# Patient Record
Sex: MALE | Race: WHITE | NOT HISPANIC OR LATINO | Employment: FULL TIME | URBAN - METROPOLITAN AREA
[De-identification: names, ages, dates, MRNs, and addresses within clinical notes are randomized per-mention and may not be internally consistent; named-entity substitution may affect disease eponyms.]

---

## 2017-11-19 ENCOUNTER — IMMUNIZATION (OUTPATIENT)
Dept: URGENT CARE | Facility: CLINIC | Age: 57
End: 2017-11-19

## 2017-11-19 PROCEDURE — 90471 IMMUNIZATION ADMIN: CPT | Mod: S$GLB,,, | Performed by: FAMILY MEDICINE

## 2017-11-19 PROCEDURE — 90686 IIV4 VACC NO PRSV 0.5 ML IM: CPT | Mod: S$GLB,,, | Performed by: FAMILY MEDICINE

## 2023-02-15 ENCOUNTER — OFFICE VISIT (OUTPATIENT)
Dept: CARDIOLOGY | Facility: CLINIC | Age: 63
End: 2023-02-15
Payer: MEDICARE

## 2023-02-15 VITALS
WEIGHT: 178.56 LBS | HEART RATE: 64 BPM | SYSTOLIC BLOOD PRESSURE: 127 MMHG | OXYGEN SATURATION: 98 % | DIASTOLIC BLOOD PRESSURE: 77 MMHG

## 2023-02-15 DIAGNOSIS — E78.5 HYPERLIPIDEMIA LDL GOAL <70: ICD-10-CM

## 2023-02-15 DIAGNOSIS — E11.69 TYPE 2 DIABETES MELLITUS WITH OTHER SPECIFIED COMPLICATION, UNSPECIFIED WHETHER LONG TERM INSULIN USE: ICD-10-CM

## 2023-02-15 DIAGNOSIS — R93.1 ELEVATED CORONARY ARTERY CALCIUM SCORE: ICD-10-CM

## 2023-02-15 DIAGNOSIS — I25.118 CORONARY ARTERY DISEASE OF NATIVE ARTERY OF NATIVE HEART WITH STABLE ANGINA PECTORIS: ICD-10-CM

## 2023-02-15 DIAGNOSIS — I65.23 CAROTID STENOSIS, ASYMPTOMATIC, BILATERAL: ICD-10-CM

## 2023-02-15 DIAGNOSIS — I10 HYPERTENSION, UNSPECIFIED TYPE: ICD-10-CM

## 2023-02-15 DIAGNOSIS — I20.89 STABLE ANGINA: Primary | ICD-10-CM

## 2023-02-15 PROCEDURE — 99999 PR PBB SHADOW E&M-NEW PATIENT-LVL III: CPT | Mod: PBBFAC,,, | Performed by: NURSE PRACTITIONER

## 2023-02-15 PROCEDURE — 99203 OFFICE O/P NEW LOW 30 MIN: CPT | Mod: PBBFAC | Performed by: NURSE PRACTITIONER

## 2023-02-15 PROCEDURE — 99204 OFFICE O/P NEW MOD 45 MIN: CPT | Mod: S$PBB,,, | Performed by: NURSE PRACTITIONER

## 2023-02-15 PROCEDURE — 99999 PR PBB SHADOW E&M-NEW PATIENT-LVL III: ICD-10-PCS | Mod: PBBFAC,,, | Performed by: NURSE PRACTITIONER

## 2023-02-15 PROCEDURE — 99204 PR OFFICE/OUTPT VISIT, NEW, LEVL IV, 45-59 MIN: ICD-10-PCS | Mod: S$PBB,,, | Performed by: NURSE PRACTITIONER

## 2023-02-15 RX ORDER — SIMVASTATIN 40 MG/1
40 TABLET, FILM COATED ORAL NIGHTLY
COMMUNITY
Start: 2023-01-30 | End: 2023-02-15 | Stop reason: ALTCHOICE

## 2023-02-15 RX ORDER — METFORMIN HYDROCHLORIDE 500 MG/1
500 TABLET, EXTENDED RELEASE ORAL 4 TIMES DAILY
COMMUNITY
End: 2024-03-08 | Stop reason: SDUPTHER

## 2023-02-15 RX ORDER — NITROGLYCERIN 0.4 MG/1
0.4 TABLET SUBLINGUAL EVERY 5 MIN PRN
Qty: 30 TABLET | Refills: 3 | Status: SHIPPED | OUTPATIENT
Start: 2023-02-15 | End: 2024-02-15

## 2023-02-15 RX ORDER — ATORVASTATIN CALCIUM 80 MG/1
80 TABLET, FILM COATED ORAL DAILY
Qty: 90 TABLET | Refills: 3 | Status: SHIPPED | OUTPATIENT
Start: 2023-02-15 | End: 2024-01-26

## 2023-02-15 RX ORDER — ATENOLOL 50 MG/1
50 TABLET ORAL 2 TIMES DAILY
COMMUNITY
Start: 2023-01-30

## 2023-02-15 RX ORDER — LINAGLIPTIN 5 MG/1
5 TABLET, FILM COATED ORAL
COMMUNITY
Start: 2023-01-30 | End: 2024-03-08

## 2023-02-15 NOTE — PROGRESS NOTES
[facility]    CC: Establish care    Patient ID: Cb Swan is a 63 y.o. 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 He was also started on ASA 81mg.    Carotid u/s revealed:  1-39% stenosis in the proximal right internal carotid artery based on Bluth Criteria.     1-39% stenosis in the proximal left internal carotid artery based on Bluth Criteria.     Antegrade right vertebral artery flow.     Antegrade left vertebral artery flow.       EKG- 2023 NSR    He did not return to [facility] for stress test or echo.    BP home log reviewed; BP controlled   Takes Lotrel and atenolol; reports compliance    Recent lipid panel reviewed from [facility]:  Chol 211  Tri 418  HDL 28      Has DM type 2; Most recent A1C 11.7; is not on insulin   Positive family history CAD; mother  of heart attack    Denies palpitations, SOB/HEBERT, PND, orthopnea, pre-syncope, syncope, BLE swelling.       Past Medical History:   Diagnosis Date    Diabetes mellitus     Hypertension      No past surgical history on file.  Social History     Socioeconomic History    Marital status:    Tobacco Use    Smoking status: Former   Substance and Sexual Activity    Alcohol use: No     Family History   Problem Relation Age of Onset    Diabetes Mother     Diabetes Sister        Review of patient's  allergies indicates:  No Known Allergies    Medication List with Changes/Refills   New Medications    NITROGLYCERIN (NITROSTAT) 0.4 MG SL TABLET    Place 1 tablet (0.4 mg total) under the tongue every 5 (five) minutes as needed for Chest pain.   Current Medications    ATENOLOL (TENORMIN) 50 MG TABLET    Take 50 mg by mouth 2 (two) times daily.    EMPAGLIFLOZIN (JARDIANCE) 10 MG TABLET    Jardiance 10 mg tablet    METFORMIN (GLUCOPHAGE-XR) 500 MG ER 24HR TABLET    metformin  mg tablet,extended release 24 hr    TRADJENTA 5 MG TAB TABLET    Take 5 mg by mouth.   Discontinued Medications    SIMVASTATIN (ZOCOR) 40 MG TABLET    Take 40 mg by mouth every evening.           Review of Systems   Constitutional: Negative.   Cardiovascular:  Positive for chest pain. Negative for claudication, cyanosis, dyspnea on exertion, irregular heartbeat, leg swelling, near-syncope, orthopnea, palpitations, paroxysmal nocturnal dyspnea and syncope.   Respiratory: Negative.     Skin: Negative.    Musculoskeletal: Negative.    Gastrointestinal:  Positive for abdominal pain.        Excess belching/burping with chest pain episode   Psychiatric/Behavioral: Negative.       Vitals:    02/15/23 0843   BP: 127/77   Pulse: 64   SpO2: 98%   Weight: 81 kg (178 lb 9.2 oz)          Physical Exam  Constitutional:       Appearance: He is obese.   HENT:      Head: Normocephalic.   Cardiovascular:      Rate and Rhythm: Normal rate and regular rhythm.      Heart sounds: Normal heart sounds.   Pulmonary:      Effort: Pulmonary effort is normal.      Breath sounds: Normal breath sounds.   Genitourinary:     Penis: Normal.    Musculoskeletal:         General: Normal range of motion.   Skin:     General: Skin is warm and dry.   Neurological:      Mental Status: He is alert and oriented to person, place, and time.         Labs:  Most Recent Data  CBC: No results found for: WBC, HGB, HCT, PLT, MCV, RDW  BMP: No results found for: NA, K, CL, CO2, BUN,  CREATININE, GLU, CALCIUM, MG, PHOS  LFTS; No results found for: PROT, ALBUMIN, BILITOT, AST, ALKPHOS, ALT, GGT  COAGS: No results found for: INR, PROTIME, PTT  FLP: No results found for: CHOL, HDL, LDLCALC, TRIG, CHOLHDL  CARDIAC: No results found for: TROPONINI, CKMB, BNP    Assessment/Plan:  Problem List Items Addressed This Visit          Cardiac/Vascular    Coronary artery disease of native artery of native heart with stable angina pectoris    Current Assessment & Plan     Elevated calcium score >600 in 2011  Takes ASA + statin therapy; continue   Concerns for stable angina  Recent EKG NSR, no ST changes  Start PRN nitro  Stress echo and follow up; if stress testing is abnormal, will proceed with LH scheduled at [place] with Dr. Sandoval           Hypertension    Current Assessment & Plan     Controlled  Continue current regimen         Elevated coronary artery calcium score    Current Assessment & Plan     Evans score >600 in 2011           Hyperlipidemia LDL goal <70    Current Assessment & Plan     Given CAD, uncontrolled DM- LDL goal <70  Recent panel reviewed; chol 211, trig 418, HDL 28,   Stop simvastatin   Start atorvastatin   Triglycerides and elevated A1C directly correlated; Trig should lower as glucose improves  Repeat lipid panel in nearest future             Carotid stenosis, asymptomatic, bilateral    Current Assessment & Plan     Asymptomatic  Less than 39% bilaterally to internal carotid arteries   Continue ASA , statin             Endocrine    Type 2 diabetes mellitus with other specified complication    Current Assessment & Plan     Aggressive risk factor management  A1C 11.7  He is not on insulin at this time   Management deferred to PCP          Other Visit Diagnoses       Stable angina    -  Primary            Update 2/23/2023:  Exercise stress test positive (below). Recommend proceeding with coronary angiogram to treat coronary lesions resulting in stable angina. Risk vs benefits  discussed with patient and he wishes to proceed. He will check his schedule and notify the clinic with a date and time that works for him. In the interm, continue with PRN nitro tabs, limit strenuous activity.     The ECG portion of this study is positive for myocardial ischemia.  Exercise protocol terminated early secondary to angina. NTG relieved anginal pains briskly in recovery phase. Max HR 94 BPM obtained.  The left ventricle is normal in size with normal systolic function.  The estimated ejection fraction is 55%.  The stress echo portion of this study did not demonstrate wall motion abnormalities.  There were no arrhythmias during stress.    Total duration of face to face visit time 30 minutes.  Total time spent counseling greater than fifty percent of total visit time.  Counseling included discussion regarding imaging findings, diagnosis, possibilities, treatment options, risks and benefits.  The patient had many questions regarding the options and long-term effects.    eGmma Perez, OLIVIA-C  Cardiology Clinic  [facility]

## 2023-02-15 NOTE — ASSESSMENT & PLAN NOTE
Given CAD, uncontrolled DM- LDL goal <70  Recent panel reviewed; chol 211, trig 418, HDL 28,   Stop simvastatin   Start atorvastatin   Triglycerides likely elevated due to elevated glucose;  Treat underlying disease. Will consider Lopid with repeat panel   Repeat lipid panel in nearest future

## 2023-02-15 NOTE — ASSESSMENT & PLAN NOTE
Aggressive risk factor management  A1C 11.7  He is not on insulin at this time   Management deferred to PCP

## 2023-02-15 NOTE — ASSESSMENT & PLAN NOTE
Elevated calcium score >600 in 2011  Takes ASA + statin therapy; continue   Concerns for stable angina  Recent EKG NSR, no ST changes  Start PRN nitro  Stress echo and follow up; if stress testing is abnormal, will proceed with LHC scheduled at [place] with Dr. Sandoval

## 2023-02-16 ENCOUNTER — HOSPITAL ENCOUNTER (OUTPATIENT)
Dept: PULMONOLOGY | Facility: HOSPITAL | Age: 63
Discharge: HOME | End: 2023-02-16
Attending: NURSE PRACTITIONER
Payer: MEDICARE

## 2023-02-16 DIAGNOSIS — I20.89 STABLE ANGINA: ICD-10-CM

## 2023-02-16 DIAGNOSIS — I25.118 CORONARY ARTERY DISEASE OF NATIVE ARTERY OF NATIVE HEART WITH STABLE ANGINA PECTORIS: Primary | ICD-10-CM

## 2023-02-16 LAB
CV STRESS BASE HR: 73 BPM
DIASTOLIC BLOOD PRESSURE: 71 MMHG
EJECTION FRACTION: 55 %
OHS CV CPX 1 MINUTE RECOVERY HEART RATE: 99 BPM
OHS CV CPX 85 PERCENT MAX PREDICTED HEART RATE MALE: 132
OHS CV CPX ESTIMATED METS: 7
OHS CV CPX MAX PREDICTED HEART RATE: 155
OHS CV CPX PATIENT IS FEMALE: 0
OHS CV CPX PATIENT IS MALE: 1
OHS CV CPX PEAK DIASTOLIC BLOOD PRESSURE: 68 MMHG
OHS CV CPX PEAK HEAR RATE: 120 BPM
OHS CV CPX PEAK RATE PRESSURE PRODUCT: NORMAL
OHS CV CPX PEAK SYSTOLIC BLOOD PRESSURE: 162 MMHG
OHS CV CPX PERCENT MAX PREDICTED HEART RATE ACHIEVED: 77
OHS CV CPX RATE PRESSURE PRODUCT PRESENTING: NORMAL
STRESS ECHO POST EXERCISE DUR MIN: 5 MINUTES
STRESS ECHO POST EXERCISE DUR SEC: 49 SECONDS
STRESS ST DEPRESSION: 1 MM
SYSTOLIC BLOOD PRESSURE: 142 MMHG

## 2023-02-16 PROCEDURE — 93351 STRESS TTE COMPLETE: CPT

## 2023-02-16 PROCEDURE — 93351 STRESS ECHO (CUPID ONLY): ICD-10-PCS | Mod: 26,,, | Performed by: INTERNAL MEDICINE

## 2023-02-16 PROCEDURE — 93351 STRESS TTE COMPLETE: CPT | Mod: 26,,, | Performed by: INTERNAL MEDICINE

## 2023-02-16 RX ORDER — ASPIRIN 81 MG/1
81 TABLET ORAL DAILY
Refills: 0
Start: 2023-02-16 | End: 2024-02-16

## 2023-02-16 RX ORDER — AMLODIPINE BESYLATE 2.5 MG/1
2.5 TABLET ORAL DAILY
Qty: 90 TABLET | Refills: 3 | Status: SHIPPED | OUTPATIENT
Start: 2023-02-16 | End: 2023-02-16

## 2023-02-16 RX ORDER — AMLODIPINE AND BENAZEPRIL HYDROCHLORIDE 10; 40 MG/1; MG/1
1 CAPSULE ORAL DAILY
Qty: 90 CAPSULE | Refills: 3
Start: 2023-02-16 | End: 2024-02-16

## 2023-02-17 ENCOUNTER — TELEPHONE (OUTPATIENT)
Dept: CARDIOLOGY | Facility: CLINIC | Age: 63
End: 2023-02-17
Payer: MEDICARE

## 2023-02-17 NOTE — TELEPHONE ENCOUNTER
----- Message from Gemma Perez NP sent at 2/16/2023  3:08 PM CST -----  Please ensure patient has follow up appointment scheduled to discuss stress test results and recommendations for LHC.

## 2023-02-17 NOTE — TELEPHONE ENCOUNTER
----- Message from Stevan Sandoval MD sent at 2/16/2023  8:56 AM CST -----  Please book a his wife had a lot of questions about the heart catheterization that we are going to do upcoming.  He had a treadmill test today

## 2023-02-22 ENCOUNTER — PATIENT MESSAGE (OUTPATIENT)
Dept: CARDIOLOGY | Facility: CLINIC | Age: 63
End: 2023-02-22
Payer: MEDICARE

## 2023-02-23 DIAGNOSIS — I25.118 CORONARY ARTERY DISEASE OF NATIVE ARTERY OF NATIVE HEART WITH STABLE ANGINA PECTORIS: Primary | ICD-10-CM

## 2023-02-27 DIAGNOSIS — R94.39 ABNORMAL STRESS TEST: ICD-10-CM

## 2023-02-27 DIAGNOSIS — I20.89 ANGINA OF EFFORT: Primary | ICD-10-CM

## 2023-02-27 RX ORDER — DIPHENHYDRAMINE HCL 25 MG
50 CAPSULE ORAL ONCE
Status: CANCELLED | OUTPATIENT
Start: 2023-02-27 | End: 2023-02-27

## 2023-02-27 RX ORDER — SODIUM CHLORIDE 0.9 % (FLUSH) 0.9 %
10 SYRINGE (ML) INJECTION
Status: DISCONTINUED | OUTPATIENT
Start: 2023-02-27 | End: 2023-03-14 | Stop reason: HOSPADM

## 2023-03-09 ENCOUNTER — PATIENT MESSAGE (OUTPATIENT)
Dept: CARDIOLOGY | Facility: CLINIC | Age: 63
End: 2023-03-09
Payer: MEDICARE

## 2023-03-09 ENCOUNTER — LAB VISIT (OUTPATIENT)
Dept: LAB | Facility: HOSPITAL | Age: 63
End: 2023-03-09
Attending: NURSE PRACTITIONER
Payer: MEDICARE

## 2023-03-09 DIAGNOSIS — I25.118 CORONARY ARTERY DISEASE OF NATIVE ARTERY OF NATIVE HEART WITH STABLE ANGINA PECTORIS: ICD-10-CM

## 2023-03-09 LAB
ANION GAP SERPL CALC-SCNC: 14 MMOL/L (ref 8–16)
BASOPHILS # BLD AUTO: 0.03 K/UL (ref 0–0.2)
BASOPHILS NFR BLD: 0.4 % (ref 0–1.9)
BUN SERPL-MCNC: 15 MG/DL (ref 8–23)
CALCIUM SERPL-MCNC: 9.3 MG/DL (ref 8.7–10.5)
CHLORIDE SERPL-SCNC: 106 MMOL/L (ref 95–110)
CO2 SERPL-SCNC: 21 MMOL/L (ref 23–29)
CREAT SERPL-MCNC: 1.3 MG/DL (ref 0.5–1.4)
DIFFERENTIAL METHOD: ABNORMAL
EOSINOPHIL # BLD AUTO: 0.1 K/UL (ref 0–0.5)
EOSINOPHIL NFR BLD: 1.3 % (ref 0–8)
ERYTHROCYTE [DISTWIDTH] IN BLOOD BY AUTOMATED COUNT: 12.4 % (ref 11.5–14.5)
EST. GFR  (NO RACE VARIABLE): >60 ML/MIN/1.73 M^2
GLUCOSE SERPL-MCNC: 170 MG/DL (ref 70–110)
HCT VFR BLD AUTO: 44 % (ref 40–54)
HGB BLD-MCNC: 15 G/DL (ref 14–18)
IMM GRANULOCYTES # BLD AUTO: 0.02 K/UL (ref 0–0.04)
IMM GRANULOCYTES NFR BLD AUTO: 0.3 % (ref 0–0.5)
LYMPHOCYTES # BLD AUTO: 2 K/UL (ref 1–4.8)
LYMPHOCYTES NFR BLD: 24.9 % (ref 18–48)
MCH RBC QN AUTO: 31.4 PG (ref 27–31)
MCHC RBC AUTO-ENTMCNC: 34.1 G/DL (ref 32–36)
MCV RBC AUTO: 92 FL (ref 82–98)
MONOCYTES # BLD AUTO: 0.6 K/UL (ref 0.3–1)
MONOCYTES NFR BLD: 7.4 % (ref 4–15)
NEUTROPHILS # BLD AUTO: 5.2 K/UL (ref 1.8–7.7)
NEUTROPHILS NFR BLD: 65.7 % (ref 38–73)
NRBC BLD-RTO: 0 /100 WBC
PLATELET # BLD AUTO: 183 K/UL (ref 150–450)
PMV BLD AUTO: 9.9 FL (ref 9.2–12.9)
POTASSIUM SERPL-SCNC: 3.8 MMOL/L (ref 3.5–5.1)
RBC # BLD AUTO: 4.78 M/UL (ref 4.6–6.2)
SODIUM SERPL-SCNC: 141 MMOL/L (ref 136–145)
WBC # BLD AUTO: 7.86 K/UL (ref 3.9–12.7)

## 2023-03-09 PROCEDURE — 85025 COMPLETE CBC W/AUTO DIFF WBC: CPT | Performed by: NURSE PRACTITIONER

## 2023-03-09 PROCEDURE — 36415 COLL VENOUS BLD VENIPUNCTURE: CPT | Performed by: NURSE PRACTITIONER

## 2023-03-09 PROCEDURE — 80048 BASIC METABOLIC PNL TOTAL CA: CPT | Performed by: NURSE PRACTITIONER

## 2023-03-10 ENCOUNTER — NURSE TRIAGE (OUTPATIENT)
Dept: ADMINISTRATIVE | Facility: CLINIC | Age: 63
End: 2023-03-10
Payer: MEDICARE

## 2023-03-10 NOTE — TELEPHONE ENCOUNTER
OOC RN  Patient Returning someone call in reference to preop for heart cath with Dr. Jaime Calles scheduled for 3/14/23   patient did rec  my chart message Patient needs to be pre op.   Can someone reach out to him?       Reason for Disposition   Nursing judgment    Protocols used: Information Only Call - No Triage-A-OH

## 2023-03-14 ENCOUNTER — TELEPHONE (OUTPATIENT)
Dept: CARDIOLOGY | Facility: CLINIC | Age: 63
End: 2023-03-14

## 2023-03-14 NOTE — TELEPHONE ENCOUNTER
----- Message from Stevan Sandoval MD sent at 3/14/2023 11:40 AM CDT -----  He needs a follow-up visit in [place] in 1 week.

## 2023-03-23 ENCOUNTER — OFFICE VISIT (OUTPATIENT)
Dept: CARDIOLOGY | Facility: CLINIC | Age: 63
End: 2023-03-23
Payer: MEDICARE

## 2023-03-23 VITALS
RESPIRATION RATE: 18 BRPM | HEART RATE: 72 BPM | BODY MASS INDEX: 30.28 KG/M2 | WEIGHT: 170.88 LBS | DIASTOLIC BLOOD PRESSURE: 70 MMHG | HEIGHT: 63 IN | SYSTOLIC BLOOD PRESSURE: 122 MMHG | OXYGEN SATURATION: 97 %

## 2023-03-23 DIAGNOSIS — I10 PRIMARY HYPERTENSION: ICD-10-CM

## 2023-03-23 DIAGNOSIS — I25.118 CORONARY ARTERY DISEASE OF NATIVE ARTERY OF NATIVE HEART WITH STABLE ANGINA PECTORIS: ICD-10-CM

## 2023-03-23 DIAGNOSIS — E78.2 MIXED HYPERLIPIDEMIA: ICD-10-CM

## 2023-03-23 DIAGNOSIS — E11.69 TYPE 2 DIABETES MELLITUS WITH OTHER SPECIFIED COMPLICATION, WITHOUT LONG-TERM CURRENT USE OF INSULIN: ICD-10-CM

## 2023-03-23 PROBLEM — E78.5 HYPERLIPIDEMIA LDL GOAL <70: Status: RESOLVED | Noted: 2023-02-15 | Resolved: 2023-03-23

## 2023-03-23 PROCEDURE — 99999 PR PBB SHADOW E&M-EST. PATIENT-LVL III: CPT | Mod: PBBFAC,,, | Performed by: INTERNAL MEDICINE

## 2023-03-23 PROCEDURE — 99999 PR STA SHADOW: CPT | Mod: PBBFAC,,, | Performed by: INTERNAL MEDICINE

## 2023-03-23 PROCEDURE — 99214 OFFICE O/P EST MOD 30 MIN: CPT | Mod: S$PBB | Performed by: INTERNAL MEDICINE

## 2023-03-23 PROCEDURE — 99213 OFFICE O/P EST LOW 20 MIN: CPT | Mod: PBBFAC | Performed by: INTERNAL MEDICINE

## 2023-03-23 PROCEDURE — 99999 PR PBB SHADOW E&M-EST. PATIENT-LVL III: ICD-10-PCS | Mod: PBBFAC,,, | Performed by: INTERNAL MEDICINE

## 2023-03-23 NOTE — ASSESSMENT & PLAN NOTE
He thinks his A1c will be better.  He just came off a period of time when he was noncompliant with diabetic management and had gained weight.  Condition improving.    Jardiance, Tradjenta to be continued, he has side effects to metformin.  He will check in with his physician.

## 2023-03-23 NOTE — ASSESSMENT & PLAN NOTE
Recent stent-drug-eluting type to circumflex.  He is free of angina.  He is on aspirin and Plavix.    We reviewed his films including discussion of diseased right coronary artery.  Continued medical therapy advised for now.

## 2023-03-23 NOTE — PROGRESS NOTES
[facility]     Subjective:    Patient ID:  Cb Swan is a 63 y.o. male who presents for follow-up of Coronary Artery Disease, Hypertension, Hyperlipidemia, and Chest Pain    Review of patient's allergies indicates:  No Known Allergies   He underwent coronary angiogram with stent placement to circumflex for high-grade stenosis associated with worsening angina.  He has done well since hospital discharge.  He has some cutaneous bruising.  He is tolerating aspirin and Plavix.    His blood pressure is normal today.  He is on Lotrel and atenolol.  He is diabetic.  He states that he had most recent A1c 11 range.  He had stopped his medications and gained weight.  He has now lost weight and it is 2 months later.  He thinks his sugars will be much better.  He is on Tradjenta, Jardiance, metformin.  States that metformin does cause gastrointestinal side effects.    He has not done a lot of exercise.  He wants to get back into it      Review of Systems   Constitutional: Positive for weight loss. Negative for chills, decreased appetite, diaphoresis, fever, malaise/fatigue and night sweats.   HENT:  Negative for congestion, ear discharge, ear pain, hearing loss, hoarse voice, nosebleeds, odynophagia, sore throat, stridor and tinnitus.    Eyes:  Negative for blurred vision, discharge, double vision, pain, photophobia, redness, vision loss in left eye, vision loss in right eye, visual disturbance and visual halos.   Cardiovascular:  Negative for chest pain, claudication, cyanosis, dyspnea on exertion, irregular heartbeat, leg swelling, near-syncope, orthopnea, palpitations, paroxysmal nocturnal dyspnea and syncope.   Respiratory:  Negative for cough, hemoptysis, shortness of breath, sleep disturbances due to breathing, snoring, sputum production and wheezing.    Endocrine: Negative for cold intolerance, heat intolerance, polydipsia, polyphagia and  "polyuria.   Hematologic/Lymphatic: Negative for adenopathy and bleeding problem. Bruises/bleeds easily.   Skin:  Negative for color change, dry skin, flushing, itching, nail changes, poor wound healing, rash, skin cancer, suspicious lesions and unusual hair distribution.   Musculoskeletal:  Negative for arthritis, back pain, falls, gout, joint pain, joint swelling, muscle cramps, muscle weakness, myalgias, neck pain and stiffness.   Gastrointestinal:  Negative for bloating, abdominal pain, anorexia, change in bowel habit, bowel incontinence, constipation, diarrhea, dysphagia, excessive appetite, flatus, heartburn, hematemesis, hematochezia, hemorrhoids, jaundice, melena, nausea and vomiting.   Genitourinary:  Negative for bladder incontinence, decreased libido, dysuria, flank pain, frequency, genital sores, hematuria, hesitancy, incomplete emptying, nocturia and urgency.   Neurological:  Negative for aphonia, brief paralysis, difficulty with concentration, disturbances in coordination, excessive daytime sleepiness, dizziness, focal weakness, headaches, light-headedness, loss of balance, numbness, paresthesias, seizures, sensory change, tremors, vertigo and weakness.   Psychiatric/Behavioral:  Negative for altered mental status, depression, hallucinations, memory loss, substance abuse, suicidal ideas and thoughts of violence. The patient does not have insomnia and is not nervous/anxious.    Allergic/Immunologic: Negative for hives and persistent infections.      Objective:       Vitals:    03/23/23 0833   BP: 122/70   BP Location: Right arm   Patient Position: Sitting   BP Method: Medium (Manual)   Pulse: 72   Resp: 18   SpO2: 97%   Weight: 77.5 kg (170 lb 13.7 oz)   Height: 5' 3" (1.6 m)    Physical Exam  Constitutional:       General: He is not in acute distress.     Appearance: He is well-developed. He is not diaphoretic.   HENT:      Head: Normocephalic and atraumatic.      Nose: Nose normal.   Eyes:      " General: No scleral icterus.        Right eye: No discharge.      Conjunctiva/sclera: Conjunctivae normal.      Pupils: Pupils are equal, round, and reactive to light.   Neck:      Thyroid: No thyromegaly.      Vascular: No JVD.      Trachea: No tracheal deviation.   Cardiovascular:      Rate and Rhythm: Normal rate and regular rhythm.      Pulses:           Carotid pulses are 2+ on the right side and 2+ on the left side.       Radial pulses are 2+ on the right side and 2+ on the left side.        Dorsalis pedis pulses are 2+ on the right side and 2+ on the left side.        Posterior tibial pulses are 2+ on the right side and 2+ on the left side.      Heart sounds: Normal heart sounds. No murmur heard.    No friction rub. No gallop.   Pulmonary:      Effort: Pulmonary effort is normal. No respiratory distress.      Breath sounds: Normal breath sounds. No stridor. No wheezing or rales.   Chest:      Chest wall: No tenderness.   Abdominal:      General: Bowel sounds are normal. There is no distension.      Palpations: Abdomen is soft. There is no mass.      Tenderness: There is no abdominal tenderness. There is no guarding or rebound.   Musculoskeletal:         General: No tenderness. Normal range of motion.      Cervical back: Normal range of motion and neck supple.   Lymphadenopathy:      Cervical: No cervical adenopathy.   Skin:     General: Skin is warm and dry.      Coloration: Skin is not pale.      Findings: No erythema or rash.   Neurological:      Mental Status: He is alert and oriented to person, place, and time.      Cranial Nerves: No cranial nerve deficit.      Coordination: Coordination normal.   Psychiatric:         Behavior: Behavior normal.         Thought Content: Thought content normal.         Judgment: Judgment normal.         Assessment:       1. Primary hypertension    2. Mixed hyperlipidemia    3. Coronary artery disease of native artery of native heart with stable angina pectoris    4. Type 2  diabetes mellitus with other specified complication, without long-term current use of insulin      Results for orders placed or performed during the hospital encounter of 03/14/23   Lipid panel   Result Value Ref Range    Cholesterol 108 (L) 120 - 199 mg/dL    Triglycerides 175 (H) 30 - 150 mg/dL    HDL 26 (L) 40 - 75 mg/dL    LDL Cholesterol 47.0 (L) 63.0 - 159.0 mg/dL    HDL/Cholesterol Ratio 24.1 20.0 - 50.0 %    Total Cholesterol/HDL Ratio 4.2 2.0 - 5.0    Non-HDL Cholesterol 82 mg/dL   Basic metabolic panel   Result Value Ref Range    Sodium 142 136 - 145 mmol/L    Potassium 4.6 3.5 - 5.1 mmol/L    Chloride 106 95 - 110 mmol/L    CO2 24 23 - 29 mmol/L    Glucose 120 (H) 70 - 110 mg/dL    BUN 19 2 - 20 mg/dL    Creatinine 1.03 0.50 - 1.40 mg/dL    Calcium 9.6 8.7 - 10.5 mg/dL    Anion Gap 12 8 - 16 mmol/L    eGFR >60.0 >60 mL/min/1.73 m^2   Type & Screen   Result Value Ref Range    Group & Rh B POS     Indirect Sandepe NEG    Cardiac catheterization   Result Value Ref Range    Cath EF Estimated 60 %   ISTAT ACT-K   Result Value Ref Range    POC ACTIVATED CLOTTING TIME K 305 (H) 74 - 137 sec    Sample ARTERIAL          Current Outpatient Medications:     amLODIPine-benazepriL (LOTREL) 10-40 mg per capsule, Take 1 capsule by mouth once daily., Disp: 90 capsule, Rfl: 3    aspirin (ECOTRIN) 81 MG EC tablet, Take 1 tablet (81 mg total) by mouth once daily., Disp: , Rfl: 0    atenoloL (TENORMIN) 50 MG tablet, Take 50 mg by mouth 2 (two) times daily., Disp: , Rfl:     atorvastatin (LIPITOR) 80 MG tablet, Take 1 tablet (80 mg total) by mouth once daily., Disp: 90 tablet, Rfl: 3    clopidogreL (PLAVIX) 75 mg tablet, Take 1 tablet (75 mg total) by mouth once daily., Disp: 90 tablet, Rfl: 3    empagliflozin (JARDIANCE) 10 mg tablet, Jardiance 10 mg tablet, Disp: , Rfl:     metFORMIN (GLUCOPHAGE-XR) 500 MG ER 24hr tablet, 500 mg 4 (four) times daily., Disp: , Rfl:     TRADJENTA 5 mg Tab tablet, Take 5 mg by mouth., Disp: ,  Rfl:     nitroGLYCERIN (NITROSTAT) 0.4 MG SL tablet, Place 1 tablet (0.4 mg total) under the tongue every 5 (five) minutes as needed for Chest pain. (Patient not taking: Reported on 3/23/2023), Disp: 30 tablet, Rfl: 3     Lab Results   Component Value Date    WBC 7.86 03/09/2023    RBC 4.78 03/09/2023    HGB 15.0 03/09/2023    HCT 44.0 03/09/2023    MCV 92 03/09/2023    MCH 31.4 (H) 03/09/2023    MCHC 34.1 03/09/2023    RDW 12.4 03/09/2023     03/09/2023    MPV 9.9 03/09/2023    GRAN 5.2 03/09/2023    GRAN 65.7 03/09/2023    LYMPH 2.0 03/09/2023    LYMPH 24.9 03/09/2023    MONO 0.6 03/09/2023    MONO 7.4 03/09/2023    EOS 0.1 03/09/2023    BASO 0.03 03/09/2023    EOSINOPHIL 1.3 03/09/2023    BASOPHIL 0.4 03/09/2023        CMP  Lab Results   Component Value Date     03/14/2023    K 4.6 03/14/2023     03/14/2023    CO2 24 03/14/2023     (H) 03/14/2023    BUN 19 03/14/2023    CREATININE 1.03 03/14/2023    CALCIUM 9.6 03/14/2023    ANIONGAP 12 03/14/2023        No results found for: LABBLOO, LABURIN, RESPIRATORYC, GSRESP         Results for orders placed or performed in visit on 03/14/23   EKG 12-lead    Collection Time: 03/14/23 11:13 AM    Narrative    Test Reason : Z98.890,    Vent. Rate : 061 BPM     Atrial Rate : 061 BPM     P-R Int : 138 ms          QRS Dur : 078 ms      QT Int : 402 ms       P-R-T Axes : 070 035 051 degrees     QTc Int : 404 ms    Normal sinus rhythm  Cannot rule out Anterior infarct ,age undetermined  Abnormal ECG  When compared with ECG of 14-MAR-2023 07:31,  No significant change was found  Confirmed by Alan PALOMARES MD, Jabier ESPINOZA ([id number]) on 3/14/2023 2:48:09 PM    Referred By: System System           Confirmed By:Jabier Phillips III, MD                  Plan:       Problem List Items Addressed This Visit          Cardiac/Vascular    Coronary artery disease of native artery of native heart with stable angina pectoris     Recent stent-drug-eluting type to circumflex.  He  is free of angina.  He is on aspirin and Plavix.    We reviewed his films including discussion of diseased right coronary artery.  Continued medical therapy advised for now.         Hypertension     The patient remains on amlodipine, atenolol, benazepril.  Blood pressure readings stable.  Blood pressure today 122/70 mm Hg.  No changes advised.         Mixed hyperlipidemia     Most recent LDL 47, HDL 26, triglycerides 175 mg % on atorvastatin 80 mg daily.  I do not advise any change in management.  Atorvastatin tolerated well.            Endocrine    Type 2 diabetes mellitus with other specified complication     He thinks his A1c will be better.  He just came off a period of time when he was noncompliant with diabetic management and had gained weight.  Condition improving.    Jardiance, Tradjenta to be continued, he has side effects to metformin.  He will check in with his physician.                 His angina has stabilized.  He is cleared to perform activities without restrictions.  I will see him back in 4 months.  Education provided regarding stenting, management of coronary disease long-term.  He does have moderate right coronary artery stenosis and mild LAD disease.  His ejection fraction is normal.           Stevan Sandoval MD  03/23/2023   9:13 AM

## 2023-03-23 NOTE — ASSESSMENT & PLAN NOTE
Most recent LDL 47, HDL 26, triglycerides 175 mg % on atorvastatin 80 mg daily.  I do not advise any change in management.  Atorvastatin tolerated well.

## 2023-03-23 NOTE — ASSESSMENT & PLAN NOTE
The patient remains on amlodipine, atenolol, benazepril.  Blood pressure readings stable.  Blood pressure today 122/70 mm Hg.  No changes advised.

## 2023-04-06 ENCOUNTER — OFFICE VISIT (OUTPATIENT)
Dept: URGENT CARE | Facility: CLINIC | Age: 63
End: 2023-04-06
Payer: MEDICARE

## 2023-04-06 VITALS
RESPIRATION RATE: 16 BRPM | SYSTOLIC BLOOD PRESSURE: 141 MMHG | HEART RATE: 56 BPM | DIASTOLIC BLOOD PRESSURE: 82 MMHG | BODY MASS INDEX: 30.11 KG/M2 | OXYGEN SATURATION: 99 % | WEIGHT: 170 LBS | TEMPERATURE: 98 F

## 2023-04-06 DIAGNOSIS — L23.9 ALLERGIC CONTACT DERMATITIS, UNSPECIFIED TRIGGER: Primary | ICD-10-CM

## 2023-04-06 PROCEDURE — 99213 PR OFFICE/OUTPT VISIT, EST, LEVL III, 20-29 MIN: ICD-10-PCS | Mod: S$GLB,,, | Performed by: FAMILY MEDICINE

## 2023-04-06 PROCEDURE — 99213 OFFICE O/P EST LOW 20 MIN: CPT | Mod: S$GLB,,, | Performed by: FAMILY MEDICINE

## 2023-04-06 RX ORDER — METHYLPREDNISOLONE 4 MG/1
TABLET ORAL
Qty: 21 EACH | Refills: 0 | Status: SHIPPED | OUTPATIENT
Start: 2023-04-06 | End: 2023-04-27

## 2023-04-06 RX ORDER — MOMETASONE FUROATE 1 MG/G
CREAM TOPICAL
Qty: 45 G | Refills: 1 | Status: SHIPPED | OUTPATIENT
Start: 2023-04-06 | End: 2023-07-24

## 2023-04-06 NOTE — PROGRESS NOTES
"Subjective:      Patient ID: Cb Swan is a 63 y.o. male.    Vitals:  weight is 77.1 kg (170 lb). His oral temperature is 97.5 °F (36.4 °C). His blood pressure is 141/82 (abnormal) and his pulse is 56 (abnormal). His respiration is 16 and oxygen saturation is 99%.     Chief Complaint: Rash    This is a 63 y.o. male who presents today with a chief complaint of rash to the hands and body that "seems to move" x 3 days. Rash is blotchy red and itches.     Home tx: Zyrtec (last dose yesterday) - some relief    PMH: [facility] and was given a steroid shot and told to take Zyrtec - rash subsided but returned in different location    Rash  The affected locations include the scalp, face, left ear, back, groin, left eye, left hand, left wrist, left fingers, right hand and right wrist. The rash is characterized by swelling, redness and itchiness. He was exposed to nothing. Pertinent negatives include no congestion, cough, fever or sore throat. There is no history of allergies or asthma.     Constitution: Negative for fever.   HENT:  Negative for congestion and sore throat.    Respiratory:  Negative for cough.    Skin:  Positive for rash and erythema.    Objective:     Physical Exam   Constitutional: He does not appear ill. No distress. normal  Cardiovascular: Normal rate, regular rhythm, normal heart sounds and normal pulses.   Pulmonary/Chest: Effort normal and breath sounds normal.   Abdominal: Normal appearance.   Neurological: He is alert.   Skin: Skin is rash. erythema and lesion (scattered red papular lesions/hives on extremities, back buttocks)   Nursing note and vitals reviewed.    Assessment:     1. Allergic contact dermatitis, unspecified trigger        Plan:       Allergic contact dermatitis, unspecified trigger  -     methylPREDNISolone (MEDROL DOSEPACK) 4 mg tablet; use as directed  Dispense: 21 each; Refill: 0  -     mometasone 0.1% (ELOCON) 0.1 % cream; Apply to affected area " daily  Dispense: 45 g; Refill: 1    RTC prn worsening symptoms

## 2023-07-24 ENCOUNTER — OFFICE VISIT (OUTPATIENT)
Dept: CARDIOLOGY | Facility: CLINIC | Age: 63
End: 2023-07-24
Payer: MEDICARE

## 2023-07-24 VITALS
SYSTOLIC BLOOD PRESSURE: 126 MMHG | WEIGHT: 174.81 LBS | RESPIRATION RATE: 18 BRPM | BODY MASS INDEX: 30.97 KG/M2 | HEIGHT: 63 IN | OXYGEN SATURATION: 98 % | DIASTOLIC BLOOD PRESSURE: 80 MMHG | HEART RATE: 61 BPM

## 2023-07-24 DIAGNOSIS — E11.69 TYPE 2 DIABETES MELLITUS WITH OTHER SPECIFIED COMPLICATION, WITHOUT LONG-TERM CURRENT USE OF INSULIN: ICD-10-CM

## 2023-07-24 DIAGNOSIS — I25.118 CORONARY ARTERY DISEASE OF NATIVE ARTERY OF NATIVE HEART WITH STABLE ANGINA PECTORIS: ICD-10-CM

## 2023-07-24 DIAGNOSIS — E66.09 CLASS 1 OBESITY DUE TO EXCESS CALORIES WITHOUT SERIOUS COMORBIDITY WITH BODY MASS INDEX (BMI) OF 30.0 TO 30.9 IN ADULT: ICD-10-CM

## 2023-07-24 DIAGNOSIS — I10 PRIMARY HYPERTENSION: ICD-10-CM

## 2023-07-24 DIAGNOSIS — I65.23 CAROTID STENOSIS, ASYMPTOMATIC, BILATERAL: ICD-10-CM

## 2023-07-24 DIAGNOSIS — E78.2 MIXED HYPERLIPIDEMIA: ICD-10-CM

## 2023-07-24 PROBLEM — E66.811 CLASS 1 OBESITY DUE TO EXCESS CALORIES WITHOUT SERIOUS COMORBIDITY IN ADULT: Status: ACTIVE | Noted: 2023-07-24

## 2023-07-24 PROCEDURE — 99999 PR STA SHADOW: CPT | Mod: PBBFAC,,, | Performed by: INTERNAL MEDICINE

## 2023-07-24 PROCEDURE — 99213 OFFICE O/P EST LOW 20 MIN: CPT | Mod: PBBFAC | Performed by: INTERNAL MEDICINE

## 2023-07-24 PROCEDURE — 99214 OFFICE O/P EST MOD 30 MIN: CPT | Mod: S$PBB | Performed by: INTERNAL MEDICINE

## 2023-07-24 PROCEDURE — 99999 PR PBB SHADOW E&M-EST. PATIENT-LVL III: CPT | Mod: PBBFAC,,, | Performed by: INTERNAL MEDICINE

## 2023-07-24 PROCEDURE — 99999 PR STA SHADOW: ICD-10-PCS | Mod: PBBFAC,,, | Performed by: INTERNAL MEDICINE

## 2023-07-24 NOTE — ASSESSMENT & PLAN NOTE
Most recent profile confirms LDL 47.  Current therapy-atorvastatin 80 to continue.  It is well tolerated.  He has upcoming labs with his PCP.

## 2023-07-24 NOTE — PROGRESS NOTES
[facility] Cardiology     Subjective:    Patient ID:  Cb Swan is a 63 y.o. male who presents for follow-up of Hypertension, Hyperlipidemia, Diabetes Mellitus, and Coronary Artery Disease    Review of patient's allergies indicates:  No Known Allergies   He is followed for coronary artery disease with history of stenting March 14, 2023 to circumflex for high-grade stenosis.  It was a discrete midvessel lesion.  He had an abnormal stress test and reported exertional angina preceding the stent procedure.  He remains on calcium channel blocker therapy, beta-blocker therapy.  He monitors his blood pressures and sees mostly 120-130 mmHg systolic range readings.  He stays active.  He is on aspirin and Plavix.  He reports minimal bruising intermittently.    The patient's LDL most recently was 47 mg% in March of 2023 with HDL 26, triglycerides 175 mg%.  He is on high-dose atorvastatin 80 mg.  He has upcoming yearly labs with his PCP.  For diabetes he takes Jardiance, Tradjenta and Glucophage.  His cardiac catheterization showed 60% right coronary artery stenosis, 40% left anterior descending artery disease.  He has not had angina since his stent.      Review of Systems   Constitutional: Negative for chills, decreased appetite, diaphoresis, fever, malaise/fatigue, night sweats, weight gain and weight loss.   HENT:  Negative for congestion, ear discharge, ear pain, hearing loss, hoarse voice, nosebleeds, odynophagia, sore throat, stridor and tinnitus.    Eyes:  Negative for blurred vision, discharge, double vision, pain, photophobia, redness, vision loss in left eye, vision loss in right eye, visual disturbance and visual halos.   Cardiovascular:  Negative for chest pain, claudication, cyanosis, dyspnea on exertion, irregular heartbeat, leg swelling, near-syncope, orthopnea, palpitations, paroxysmal nocturnal dyspnea and syncope.   Respiratory:   "Negative for cough, hemoptysis, shortness of breath, sleep disturbances due to breathing, snoring, sputum production and wheezing.    Endocrine: Negative for cold intolerance, heat intolerance, polydipsia, polyphagia and polyuria.   Hematologic/Lymphatic: Negative for adenopathy and bleeding problem. Does not bruise/bleed easily.   Skin:  Negative for color change, dry skin, flushing, itching, nail changes, poor wound healing, rash, skin cancer, suspicious lesions and unusual hair distribution.   Musculoskeletal:  Negative for arthritis, back pain, falls, gout, joint pain, joint swelling, muscle cramps, muscle weakness, myalgias, neck pain and stiffness.   Gastrointestinal:  Negative for bloating, abdominal pain, anorexia, change in bowel habit, bowel incontinence, constipation, diarrhea, dysphagia, excessive appetite, flatus, heartburn, hematemesis, hematochezia, hemorrhoids, jaundice, melena, nausea and vomiting.   Genitourinary:  Negative for bladder incontinence, decreased libido, dysuria, flank pain, frequency, genital sores, hematuria, hesitancy, incomplete emptying, nocturia and urgency.   Neurological:  Negative for aphonia, brief paralysis, difficulty with concentration, disturbances in coordination, excessive daytime sleepiness, dizziness, focal weakness, headaches, light-headedness, loss of balance, numbness, paresthesias, seizures, sensory change, tremors, vertigo and weakness.   Psychiatric/Behavioral:  Negative for altered mental status, depression, hallucinations, memory loss, substance abuse, suicidal ideas and thoughts of violence. The patient does not have insomnia and is not nervous/anxious.    Allergic/Immunologic: Negative for hives and persistent infections.      Objective:       Vitals:    07/24/23 0828   BP: 126/80   Pulse: 61   Resp: 18   SpO2: 98%   Weight: 79.3 kg (174 lb 13.2 oz)   Height: 5' 3" (1.6 m)    Physical Exam  Constitutional:       General: He is not in acute distress.     " Appearance: He is well-developed. He is not diaphoretic.   HENT:      Head: Normocephalic and atraumatic.      Nose: Nose normal.   Eyes:      General: No scleral icterus.        Right eye: No discharge.      Conjunctiva/sclera: Conjunctivae normal.      Pupils: Pupils are equal, round, and reactive to light.   Neck:      Thyroid: No thyromegaly.      Vascular: No JVD.      Trachea: No tracheal deviation.   Cardiovascular:      Rate and Rhythm: Normal rate and regular rhythm.      Pulses:           Carotid pulses are 2+ on the right side and 2+ on the left side.       Radial pulses are 2+ on the right side and 2+ on the left side.        Dorsalis pedis pulses are 2+ on the right side and 2+ on the left side.        Posterior tibial pulses are 2+ on the right side and 2+ on the left side.      Heart sounds: Normal heart sounds. No murmur heard.    No friction rub. No gallop.   Pulmonary:      Effort: Pulmonary effort is normal. No respiratory distress.      Breath sounds: Normal breath sounds. No stridor. No wheezing or rales.   Chest:      Chest wall: No tenderness.   Abdominal:      General: Bowel sounds are normal. There is no distension.      Palpations: Abdomen is soft. There is no mass.      Tenderness: There is no abdominal tenderness. There is no guarding or rebound.   Musculoskeletal:         General: No tenderness. Normal range of motion.      Cervical back: Normal range of motion and neck supple.   Lymphadenopathy:      Cervical: No cervical adenopathy.   Skin:     General: Skin is warm and dry.      Coloration: Skin is not pale.      Findings: No erythema or rash.   Neurological:      Mental Status: He is alert and oriented to person, place, and time.      Cranial Nerves: No cranial nerve deficit.      Coordination: Coordination normal.   Psychiatric:         Behavior: Behavior normal.         Thought Content: Thought content normal.         Judgment: Judgment normal.         Assessment:       1. Carotid  stenosis, asymptomatic, bilateral    2. Coronary artery disease of native artery of native heart with stable angina pectoris    3. Primary hypertension    4. Type 2 diabetes mellitus with other specified complication, without long-term current use of insulin    5. Mixed hyperlipidemia    6. Class 1 obesity due to excess calories without serious comorbidity with body mass index (BMI) of 30.0 to 30.9 in adult      Results for orders placed or performed during the hospital encounter of 03/14/23   Lipid panel   Result Value Ref Range    Cholesterol 108 (L) 120 - 199 mg/dL    Triglycerides 175 (H) 30 - 150 mg/dL    HDL 26 (L) 40 - 75 mg/dL    LDL Cholesterol 47.0 (L) 63.0 - 159.0 mg/dL    HDL/Cholesterol Ratio 24.1 20.0 - 50.0 %    Total Cholesterol/HDL Ratio 4.2 2.0 - 5.0    Non-HDL Cholesterol 82 mg/dL   Basic metabolic panel   Result Value Ref Range    Sodium 142 136 - 145 mmol/L    Potassium 4.6 3.5 - 5.1 mmol/L    Chloride 106 95 - 110 mmol/L    CO2 24 23 - 29 mmol/L    Glucose 120 (H) 70 - 110 mg/dL    BUN 19 2 - 20 mg/dL    Creatinine 1.03 0.50 - 1.40 mg/dL    Calcium 9.6 8.7 - 10.5 mg/dL    Anion Gap 12 8 - 16 mmol/L    eGFR >60.0 >60 mL/min/1.73 m^2   Type & Screen   Result Value Ref Range    Group & Rh B POS     Indirect Sandeep NEG    Cardiac catheterization   Result Value Ref Range    Cath EF Estimated 60 %   ISTAT ACT-K   Result Value Ref Range    POC ACTIVATED CLOTTING TIME K 305 (H) 74 - 137 sec    Sample ARTERIAL          Current Outpatient Medications:     amLODIPine-benazepriL (LOTREL) 10-40 mg per capsule, Take 1 capsule by mouth once daily., Disp: 90 capsule, Rfl: 3    aspirin (ECOTRIN) 81 MG EC tablet, Take 1 tablet (81 mg total) by mouth once daily., Disp: , Rfl: 0    atenoloL (TENORMIN) 50 MG tablet, Take 50 mg by mouth 2 (two) times daily., Disp: , Rfl:     atorvastatin (LIPITOR) 80 MG tablet, Take 1 tablet (80 mg total) by mouth once daily., Disp: 90 tablet, Rfl: 3    clopidogreL (PLAVIX) 75 mg  tablet, Take 1 tablet (75 mg total) by mouth once daily., Disp: 90 tablet, Rfl: 3    empagliflozin (JARDIANCE) 10 mg tablet, Jardiance 10 mg tablet, Disp: , Rfl:     metFORMIN (GLUCOPHAGE-XR) 500 MG ER 24hr tablet, 500 mg 4 (four) times daily., Disp: , Rfl:     nitroGLYCERIN (NITROSTAT) 0.4 MG SL tablet, Place 1 tablet (0.4 mg total) under the tongue every 5 (five) minutes as needed for Chest pain., Disp: 30 tablet, Rfl: 3    TRADJENTA 5 mg Tab tablet, Take 5 mg by mouth., Disp: , Rfl:      Lab Results   Component Value Date    WBC 7.86 03/09/2023    RBC 4.78 03/09/2023    HGB 15.0 03/09/2023    HCT 44.0 03/09/2023    MCV 92 03/09/2023    MCH 31.4 (H) 03/09/2023    MCHC 34.1 03/09/2023    RDW 12.4 03/09/2023     03/09/2023    MPV 9.9 03/09/2023    GRAN 5.2 03/09/2023    GRAN 65.7 03/09/2023    LYMPH 2.0 03/09/2023    LYMPH 24.9 03/09/2023    MONO 0.6 03/09/2023    MONO 7.4 03/09/2023    EOS 0.1 03/09/2023    BASO 0.03 03/09/2023    EOSINOPHIL 1.3 03/09/2023    BASOPHIL 0.4 03/09/2023        CMP  Lab Results   Component Value Date     03/14/2023    K 4.6 03/14/2023     03/14/2023    CO2 24 03/14/2023     (H) 03/14/2023    BUN 19 03/14/2023    CREATININE 1.03 03/14/2023    CALCIUM 9.6 03/14/2023    ANIONGAP 12 03/14/2023        No results found for: LABBLOO, LABURIN, RESPIRATORYC, GSRESP         Results for orders placed or performed in visit on 03/14/23   EKG 12-lead    Collection Time: 03/14/23 11:13 AM    Narrative    Test Reason : Z98.890,    Vent. Rate : 061 BPM     Atrial Rate : 061 BPM     P-R Int : 138 ms          QRS Dur : 078 ms      QT Int : 402 ms       P-R-T Axes : 070 035 051 degrees     QTc Int : 404 ms    Normal sinus rhythm  Cannot rule out Anterior infarct ,age undetermined  Abnormal ECG  When compared with ECG of 14-MAR-2023 07:31,  No significant change was found  Confirmed by Jabier Phillips III, MD ([id number]) on 3/14/2023 2:48:09 PM    Referred By: System System            Confirmed By:Jabier Phillips III, MD                  Plan:       Problem List Items Addressed This Visit          Cardiac/Vascular    Coronary artery disease of native artery of native heart with stable angina pectoris     Stenting to circumflex March 2023.  He is free of angina.  His current therapy will be continued.  Condition stable.         Hypertension     He monitors his readings and sees 120-130 systolic range.  His current therapy will be continued.         Carotid stenosis, asymptomatic, bilateral     There was no significant carotid disease by Doppler study February 2023.         Mixed hyperlipidemia     Most recent profile confirms LDL 47.  Current therapy-atorvastatin 80 to continue.  It is well tolerated.  He has upcoming labs with his PCP.            Endocrine    Type 2 diabetes mellitus with other specified complication     Condition stable.  GFR greater than 60.         Class 1 obesity due to excess calories without serious comorbidity in adult     Weight loss encouraged.             I did not change his therapy for blood pressure.  I told the patient I would like to see more 120s and below systolic range.  I will see him back in December.    He is free of angina.               Stevan Sandoval MD  07/24/2023   9:09 AM

## 2023-07-24 NOTE — ASSESSMENT & PLAN NOTE
Stenting to circumflex March 2023.  He is free of angina.  His current therapy will be continued.  Condition stable.

## 2024-01-26 RX ORDER — ATORVASTATIN CALCIUM 80 MG/1
80 TABLET, FILM COATED ORAL
Qty: 90 TABLET | Refills: 3 | Status: SHIPPED | OUTPATIENT
Start: 2024-01-26

## 2024-02-19 ENCOUNTER — TELEPHONE (OUTPATIENT)
Dept: ENDOCRINOLOGY | Facility: CLINIC | Age: 64
End: 2024-02-19
Payer: MEDICARE

## 2024-02-19 NOTE — TELEPHONE ENCOUNTER
Patient notified of picking up a sample, he will come tmrw we will not be here will leave at the , verbalized understanding.        ----- Message from Amalia Frye sent at 2024 11:11 AM CST -----  Contact: Patient  Cb Swan  MRN: [id number]  : 1960  PCP: Steven Bernstein.  Home Phone      Not on file.  Work Phone      Not on file.  Mobile          [phone]      MESSAGE: Patient needs more samples of the Freestyle Deanna 3 until his appointment. His last one expires today    PHONE; [phone]

## 2024-03-08 ENCOUNTER — OFFICE VISIT (OUTPATIENT)
Dept: ENDOCRINOLOGY | Facility: CLINIC | Age: 64
End: 2024-03-08
Payer: MEDICARE

## 2024-03-08 ENCOUNTER — LAB VISIT (OUTPATIENT)
Dept: LAB | Facility: HOSPITAL | Age: 64
End: 2024-03-08
Attending: STUDENT IN AN ORGANIZED HEALTH CARE EDUCATION/TRAINING PROGRAM
Payer: MEDICARE

## 2024-03-08 VITALS — HEIGHT: 63 IN | BODY MASS INDEX: 31.36 KG/M2 | WEIGHT: 177 LBS

## 2024-03-08 DIAGNOSIS — E78.2 MIXED HYPERLIPIDEMIA: ICD-10-CM

## 2024-03-08 DIAGNOSIS — E11.65 TYPE 2 DIABETES MELLITUS WITH HYPERGLYCEMIA, WITH LONG-TERM CURRENT USE OF INSULIN: Primary | ICD-10-CM

## 2024-03-08 DIAGNOSIS — E11.65 TYPE 2 DIABETES MELLITUS WITH HYPERGLYCEMIA, WITH LONG-TERM CURRENT USE OF INSULIN: ICD-10-CM

## 2024-03-08 DIAGNOSIS — I10 PRIMARY HYPERTENSION: ICD-10-CM

## 2024-03-08 DIAGNOSIS — E11.69 TYPE 2 DIABETES MELLITUS WITH OTHER SPECIFIED COMPLICATION, WITHOUT LONG-TERM CURRENT USE OF INSULIN: ICD-10-CM

## 2024-03-08 DIAGNOSIS — Z79.4 TYPE 2 DIABETES MELLITUS WITH HYPERGLYCEMIA, WITH LONG-TERM CURRENT USE OF INSULIN: Primary | ICD-10-CM

## 2024-03-08 DIAGNOSIS — Z79.4 TYPE 2 DIABETES MELLITUS WITH HYPERGLYCEMIA, WITH LONG-TERM CURRENT USE OF INSULIN: ICD-10-CM

## 2024-03-08 LAB
ALBUMIN SERPL BCP-MCNC: 4 G/DL (ref 3.5–5.2)
ALBUMIN/CREAT UR: 8.5 UG/MG (ref 0–30)
ALP SERPL-CCNC: 79 U/L (ref 55–135)
ALT SERPL W/O P-5'-P-CCNC: 14 U/L (ref 10–44)
ANION GAP SERPL CALC-SCNC: 10 MMOL/L (ref 8–16)
AST SERPL-CCNC: 16 U/L (ref 10–40)
BILIRUB SERPL-MCNC: 0.5 MG/DL (ref 0.1–1)
BUN SERPL-MCNC: 21 MG/DL (ref 8–23)
C PEPTIDE SERPL-MCNC: 2.86 NG/ML (ref 0.78–5.19)
CALCIUM SERPL-MCNC: 10.1 MG/DL (ref 8.7–10.5)
CHLORIDE SERPL-SCNC: 108 MMOL/L (ref 95–110)
CHOLEST SERPL-MCNC: 107 MG/DL (ref 120–199)
CHOLEST/HDLC SERPL: 3.6 {RATIO} (ref 2–5)
CO2 SERPL-SCNC: 24 MMOL/L (ref 23–29)
CREAT SERPL-MCNC: 1.4 MG/DL (ref 0.5–1.4)
CREAT UR-MCNC: 58.9 MG/DL (ref 23–375)
EST. GFR  (NO RACE VARIABLE): 55 ML/MIN/1.73 M^2
ESTIMATED AVG GLUCOSE: 203 MG/DL (ref 68–131)
GLUCOSE SERPL-MCNC: 127 MG/DL (ref 70–110)
HBA1C MFR BLD: 8.7 % (ref 4–5.6)
HDLC SERPL-MCNC: 30 MG/DL (ref 40–75)
HDLC SERPL: 28 % (ref 20–50)
LDLC SERPL CALC-MCNC: 34.2 MG/DL (ref 63–159)
MICROALBUMIN UR DL<=1MG/L-MCNC: 5 UG/ML
NONHDLC SERPL-MCNC: 77 MG/DL
POTASSIUM SERPL-SCNC: 4.5 MMOL/L (ref 3.5–5.1)
PROT SERPL-MCNC: 7.5 G/DL (ref 6–8.4)
SODIUM SERPL-SCNC: 142 MMOL/L (ref 136–145)
TRIGL SERPL-MCNC: 214 MG/DL (ref 30–150)
TSH SERPL DL<=0.005 MIU/L-ACNC: 1.19 UIU/ML (ref 0.4–4)

## 2024-03-08 PROCEDURE — 95251 CONT GLUC MNTR ANALYSIS I&R: CPT | Performed by: STUDENT IN AN ORGANIZED HEALTH CARE EDUCATION/TRAINING PROGRAM

## 2024-03-08 PROCEDURE — 84443 ASSAY THYROID STIM HORMONE: CPT | Performed by: STUDENT IN AN ORGANIZED HEALTH CARE EDUCATION/TRAINING PROGRAM

## 2024-03-08 PROCEDURE — 36415 COLL VENOUS BLD VENIPUNCTURE: CPT | Performed by: STUDENT IN AN ORGANIZED HEALTH CARE EDUCATION/TRAINING PROGRAM

## 2024-03-08 PROCEDURE — 80053 COMPREHEN METABOLIC PANEL: CPT | Performed by: STUDENT IN AN ORGANIZED HEALTH CARE EDUCATION/TRAINING PROGRAM

## 2024-03-08 PROCEDURE — 83036 HEMOGLOBIN GLYCOSYLATED A1C: CPT | Performed by: STUDENT IN AN ORGANIZED HEALTH CARE EDUCATION/TRAINING PROGRAM

## 2024-03-08 PROCEDURE — 99999 PR STA SHADOW: CPT | Mod: PBBFAC,,, | Performed by: STUDENT IN AN ORGANIZED HEALTH CARE EDUCATION/TRAINING PROGRAM

## 2024-03-08 PROCEDURE — 99213 OFFICE O/P EST LOW 20 MIN: CPT | Mod: PBBFAC | Performed by: STUDENT IN AN ORGANIZED HEALTH CARE EDUCATION/TRAINING PROGRAM

## 2024-03-08 PROCEDURE — 99999 PR PBB SHADOW E&M-EST. PATIENT-LVL III: CPT | Mod: PBBFAC,,, | Performed by: STUDENT IN AN ORGANIZED HEALTH CARE EDUCATION/TRAINING PROGRAM

## 2024-03-08 PROCEDURE — 82043 UR ALBUMIN QUANTITATIVE: CPT | Performed by: STUDENT IN AN ORGANIZED HEALTH CARE EDUCATION/TRAINING PROGRAM

## 2024-03-08 PROCEDURE — 99204 OFFICE O/P NEW MOD 45 MIN: CPT | Mod: 25,S$PBB | Performed by: STUDENT IN AN ORGANIZED HEALTH CARE EDUCATION/TRAINING PROGRAM

## 2024-03-08 PROCEDURE — 80061 LIPID PANEL: CPT | Performed by: STUDENT IN AN ORGANIZED HEALTH CARE EDUCATION/TRAINING PROGRAM

## 2024-03-08 PROCEDURE — 84681 ASSAY OF C-PEPTIDE: CPT | Performed by: STUDENT IN AN ORGANIZED HEALTH CARE EDUCATION/TRAINING PROGRAM

## 2024-03-08 RX ORDER — TIRZEPATIDE 2.5 MG/.5ML
2.5 INJECTION, SOLUTION SUBCUTANEOUS
Qty: 4 PEN | Refills: 0 | Status: SHIPPED | OUTPATIENT
Start: 2024-03-08 | End: 2024-03-11 | Stop reason: SDUPTHER

## 2024-03-08 RX ORDER — METFORMIN HYDROCHLORIDE 500 MG/1
1000 TABLET, EXTENDED RELEASE ORAL 2 TIMES DAILY WITH MEALS
Qty: 360 TABLET | Refills: 3 | Status: SHIPPED | OUTPATIENT
Start: 2024-03-08

## 2024-03-08 RX ORDER — BLOOD-GLUCOSE SENSOR
1 EACH MISCELLANEOUS
Qty: 2 EACH | Refills: 11 | Status: SHIPPED | OUTPATIENT
Start: 2024-03-08

## 2024-03-08 NOTE — ASSESSMENT & PLAN NOTE
Uncontrolled with average glucose 205 on CGM. TIR only 40% with significant prandial rises. Will change DPP-4 inhibitor to GLP-1/GIP RA for improved glycemic control.    Continue metformin and SGLT-2 inhibitor      Plan  - Stop Tradjenta  - Start Mounjaro 2.5 mg weekly, increase every 4 weeks as tolerated  - Continue Jardiance 10 mg daily  - Continue Metformin 1,000 mg BID  - Continue FreeStyle Deanna 3    Review CGM in 4 weeks    F/u 6 months

## 2024-03-08 NOTE — PROGRESS NOTES
"Subjective:      Patient ID: Cb Swan is a 64 y.o. male.    Chief Complaint:  Type 2 diabetes mellitus    History of Present Illness  This is a 64 y.o. male. with a past medical history of Type 2 diabetes mellitus, BMI 31, CAD, HTN, HLD here for evaluation.    Type 2 diabetes mellitus  Diagnosed around age 53    Current diabetes medications:  - Metformin 1,000 mg BID  - Jardiance 10 mg daily  - Tradjenta 5 mg daily    Past diabetes medications:  - None    Lab Results   Component Value Date    CREATININE 1.4 03/08/2024    EGFRNORACEVR 55 (A) 03/08/2024       Known diabetic complications: cardiovascular disease    Weight trend:  Wt Readings from Last 8 Encounters:   03/08/24 80.3 kg (177 lb)   07/24/23 79.3 kg (174 lb 13.2 oz)   04/06/23 77.1 kg (170 lb)   03/23/23 77.5 kg (170 lb 13.7 oz)   03/14/23 83.9 kg (185 lb)   02/15/23 81 kg (178 lb 9.2 oz)       Family history of diabetes:  Yes      Blood glucose monitoring at home:           Diabetes Management Status  Statin: Taking  ACE/ARB: Not taking    Screening or Prevention Patient's value   HgA1C Testing and Control   No results found for: "HGBA1C"     LDL control Lab Results   Component Value Date    LDLCALC 34.2 (L) 03/08/2024      Nephropathy screening Lab Results   Component Value Date    MICALBCREAT 8.5 03/08/2024        Lab Results   Component Value Date    TSH 1.185 03/08/2024       Last eye exam: : 11/11/2014      Review of Systems  As above    Social and family history reviewed  Current medications and allergies reviewed    Objective:   Ht 5' 3" (1.6 m)   Wt 80.3 kg (177 lb)   BMI 31.35 kg/m²   Physical Exam  Alert, oriented    BP Readings from Last 1 Encounters:   07/24/23 126/80      Wt Readings from Last 1 Encounters:   03/08/24 1022 80.3 kg (177 lb)     Body mass index is 31.35 kg/m².    Lab Review:   No results found for: "HGBA1C"  Lab Results   Component Value Date    CHOL 107 (L) 03/08/2024    HDL 30 (L) 03/08/2024    LDLCALC 34.2 (L) " 03/08/2024    TRIG 214 (H) 03/08/2024    CHOLHDL 28.0 03/08/2024     Lab Results   Component Value Date     03/08/2024    K 4.5 03/08/2024     03/08/2024    CO2 24 03/08/2024     (H) 03/08/2024    BUN 21 03/08/2024    CREATININE 1.4 03/08/2024    CALCIUM 10.1 03/08/2024    PROT 7.5 03/08/2024    ALBUMIN 4.0 03/08/2024    BILITOT 0.5 03/08/2024    ALKPHOS 79 03/08/2024    AST 16 03/08/2024    ALT 14 03/08/2024    ANIONGAP 10 03/08/2024    TSH 1.185 03/08/2024       All pertinent labs reviewed    Assessment and Plan     Type 2 diabetes mellitus with other specified complication  Uncontrolled with average glucose 205 on CGM. TIR only 40% with significant prandial rises. Will change DPP-4 inhibitor to GLP-1/GIP RA for improved glycemic control.    Continue metformin and SGLT-2 inhibitor      Plan  - Stop Tradjenta  - Start Mounjaro 2.5 mg weekly, increase every 4 weeks as tolerated  - Continue Jardiance 10 mg daily  - Continue Metformin 1,000 mg BID  - Continue FreeStyle Deanna 3    Review CGM in 4 weeks    F/u 6 months      BMI 31.0-31.9,adult  Start GLP-1/GIP RA given weight loss benefit    Mixed hyperlipidemia  Continue statin  Followed by Cardiology  Recheck lipids    Hypertension  Continue antihypertensive regimen including ARB/ACEi      Isaiah Gongora MD  Endocrinology

## 2024-03-10 ENCOUNTER — PATIENT MESSAGE (OUTPATIENT)
Dept: ENDOCRINOLOGY | Facility: CLINIC | Age: 64
End: 2024-03-10
Payer: MEDICARE

## 2024-03-11 DIAGNOSIS — E11.65 TYPE 2 DIABETES MELLITUS WITH HYPERGLYCEMIA, WITH LONG-TERM CURRENT USE OF INSULIN: ICD-10-CM

## 2024-03-11 DIAGNOSIS — Z79.4 TYPE 2 DIABETES MELLITUS WITH HYPERGLYCEMIA, WITH LONG-TERM CURRENT USE OF INSULIN: ICD-10-CM

## 2024-03-11 RX ORDER — TIRZEPATIDE 2.5 MG/.5ML
2.5 INJECTION, SOLUTION SUBCUTANEOUS
Qty: 4 PEN | Refills: 0 | Status: SHIPPED | OUTPATIENT
Start: 2024-03-11 | End: 2024-03-21

## 2024-03-21 DIAGNOSIS — E11.65 TYPE 2 DIABETES MELLITUS WITH HYPERGLYCEMIA, WITH LONG-TERM CURRENT USE OF INSULIN: Primary | ICD-10-CM

## 2024-03-21 DIAGNOSIS — Z79.4 TYPE 2 DIABETES MELLITUS WITH HYPERGLYCEMIA, WITH LONG-TERM CURRENT USE OF INSULIN: Primary | ICD-10-CM

## 2024-03-21 RX ORDER — TIRZEPATIDE 5 MG/.5ML
5 INJECTION, SOLUTION SUBCUTANEOUS
Qty: 12 PEN | Refills: 3 | Status: SHIPPED | OUTPATIENT
Start: 2024-03-21

## 2024-03-25 ENCOUNTER — TELEPHONE (OUTPATIENT)
Dept: ENDOCRINOLOGY | Facility: CLINIC | Age: 64
End: 2024-03-25
Payer: MEDICARE

## 2024-03-25 NOTE — TELEPHONE ENCOUNTER
----- Message from Wandy Gonzalez sent at 3/25/2024 11:07 AM CDT -----  Contact: PATIENT  Cb Swan  MRN: [id number]  : 1960  PCP: Steven Bernstein.  Home Phone      Not on file.  Work Phone      Not on file.  Mobile          [phone]      MESSAGE: Patient states that [facility] faxed  Dr. Gongora paperwork with a few questions that he needed to answer before they could fill the prescription for the Deanna 3.  The prescription is needing to be prior authorized.          Phone: [phone]

## 2024-04-03 ENCOUNTER — TELEPHONE (OUTPATIENT)
Dept: ENDOCRINOLOGY | Facility: CLINIC | Age: 64
End: 2024-04-03
Payer: MEDICARE

## 2024-04-03 NOTE — TELEPHONE ENCOUNTER
Cb Swan Key: [id number] - PA Case ID: [id number]  Need help? Call us at [phone]  Outcome   Approvedtoday  CaseId:[id number];Status:Approved;Review Type:Prior Auth;Coverage Start Date:04/03/2024;Coverage End Date:04/03/2025;  Drug    FreeStyle Deanna 3 Sensor   Form    [facility] Electronic PA Form (2017 [employer])

## 2024-04-26 RX ORDER — CLOPIDOGREL BISULFATE 75 MG/1
75 TABLET ORAL
Qty: 90 TABLET | Refills: 3 | Status: SHIPPED | OUTPATIENT
Start: 2024-04-26

## 2024-06-07 ENCOUNTER — PATIENT MESSAGE (OUTPATIENT)
Dept: ENDOCRINOLOGY | Facility: CLINIC | Age: 64
End: 2024-06-07
Payer: MEDICARE

## 2024-10-04 ENCOUNTER — OFFICE VISIT (OUTPATIENT)
Dept: ENDOCRINOLOGY | Facility: CLINIC | Age: 64
End: 2024-10-04
Payer: MEDICARE

## 2024-10-04 VITALS
HEART RATE: 68 BPM | OXYGEN SATURATION: 98 % | SYSTOLIC BLOOD PRESSURE: 130 MMHG | BODY MASS INDEX: 31.35 KG/M2 | DIASTOLIC BLOOD PRESSURE: 60 MMHG | RESPIRATION RATE: 17 BRPM | HEIGHT: 63 IN

## 2024-10-04 DIAGNOSIS — E78.2 MIXED HYPERLIPIDEMIA: ICD-10-CM

## 2024-10-04 DIAGNOSIS — E11.65 TYPE 2 DIABETES MELLITUS WITH HYPERGLYCEMIA, WITHOUT LONG-TERM CURRENT USE OF INSULIN: Primary | ICD-10-CM

## 2024-10-04 DIAGNOSIS — E11.65 TYPE 2 DIABETES MELLITUS WITH HYPERGLYCEMIA, WITH LONG-TERM CURRENT USE OF INSULIN: ICD-10-CM

## 2024-10-04 DIAGNOSIS — Z79.4 TYPE 2 DIABETES MELLITUS WITH HYPERGLYCEMIA, WITH LONG-TERM CURRENT USE OF INSULIN: ICD-10-CM

## 2024-10-04 PROCEDURE — 99213 OFFICE O/P EST LOW 20 MIN: CPT | Mod: PBBFAC | Performed by: STUDENT IN AN ORGANIZED HEALTH CARE EDUCATION/TRAINING PROGRAM

## 2024-10-04 PROCEDURE — 99999 PR PBB SHADOW E&M-EST. PATIENT-LVL III: CPT | Mod: PBBFAC,,, | Performed by: STUDENT IN AN ORGANIZED HEALTH CARE EDUCATION/TRAINING PROGRAM

## 2024-10-04 RX ORDER — BLOOD-GLUCOSE SENSOR
1 EACH MISCELLANEOUS
Qty: 2 EACH | Refills: 11 | Status: SHIPPED | OUTPATIENT
Start: 2024-10-04 | End: 2025-10-04

## 2024-10-04 NOTE — ASSESSMENT & PLAN NOTE
Control improved with use of GLP-1 RA. Recent A1c 7.1% down from 8.7%. Will restart CGM and based on review, consider increasing GLP-1/GIP RA    Continue metformin and SGLT-2 inhibitor      Plan  - Continue Mounjaro 5 mg weekly, increase if indicated based on CGM  - Continue Jardiance 10 mg daily  - Continue Metformin 1,000 mg BID  - Continue FreeStyle Deanna 3 - change to plus which has had more availability    Review CGM in 1 week    F/u 3 months

## 2024-10-14 ENCOUNTER — PATIENT MESSAGE (OUTPATIENT)
Dept: ENDOCRINOLOGY | Facility: CLINIC | Age: 64
End: 2024-10-14
Payer: MEDICARE

## 2024-10-15 DIAGNOSIS — E11.65 TYPE 2 DIABETES MELLITUS WITH HYPERGLYCEMIA, WITH LONG-TERM CURRENT USE OF INSULIN: Primary | ICD-10-CM

## 2024-10-15 DIAGNOSIS — Z79.4 TYPE 2 DIABETES MELLITUS WITH HYPERGLYCEMIA, WITH LONG-TERM CURRENT USE OF INSULIN: Primary | ICD-10-CM

## 2024-10-15 RX ORDER — TIRZEPATIDE 7.5 MG/.5ML
7.5 INJECTION, SOLUTION SUBCUTANEOUS
Qty: 4 PEN | Refills: 11 | Status: SHIPPED | OUTPATIENT
Start: 2024-10-15

## 2024-10-23 ENCOUNTER — OFFICE VISIT (OUTPATIENT)
Dept: CARDIOLOGY | Facility: CLINIC | Age: 64
End: 2024-10-23
Payer: MEDICARE

## 2024-10-23 VITALS
OXYGEN SATURATION: 98 % | DIASTOLIC BLOOD PRESSURE: 81 MMHG | HEART RATE: 71 BPM | HEIGHT: 63 IN | SYSTOLIC BLOOD PRESSURE: 155 MMHG | WEIGHT: 168.75 LBS | BODY MASS INDEX: 29.9 KG/M2

## 2024-10-23 DIAGNOSIS — I25.118 CORONARY ARTERY DISEASE OF NATIVE ARTERY OF NATIVE HEART WITH STABLE ANGINA PECTORIS: ICD-10-CM

## 2024-10-23 DIAGNOSIS — I10 PRIMARY HYPERTENSION: ICD-10-CM

## 2024-10-23 DIAGNOSIS — E78.5 HYPERLIPIDEMIA, UNSPECIFIED HYPERLIPIDEMIA TYPE: Primary | ICD-10-CM

## 2024-10-23 DIAGNOSIS — E11.65 TYPE 2 DIABETES MELLITUS WITH HYPERGLYCEMIA, WITHOUT LONG-TERM CURRENT USE OF INSULIN: ICD-10-CM

## 2024-10-23 DIAGNOSIS — E78.2 MIXED HYPERLIPIDEMIA: ICD-10-CM

## 2024-10-23 DIAGNOSIS — I65.23 CAROTID STENOSIS, ASYMPTOMATIC, BILATERAL: ICD-10-CM

## 2024-10-23 PROCEDURE — 99214 OFFICE O/P EST MOD 30 MIN: CPT | Mod: S$PBB | Performed by: INTERNAL MEDICINE

## 2024-10-23 PROCEDURE — 99999 PR STA SHADOW: CPT | Mod: PBBFAC,,, | Performed by: INTERNAL MEDICINE

## 2024-10-23 PROCEDURE — 99999 PR PBB SHADOW E&M-EST. PATIENT-LVL III: CPT | Mod: PBBFAC,,, | Performed by: INTERNAL MEDICINE

## 2024-10-23 PROCEDURE — 99213 OFFICE O/P EST LOW 20 MIN: CPT | Mod: PBBFAC | Performed by: INTERNAL MEDICINE

## 2024-10-23 RX ORDER — NITROGLYCERIN 0.4 MG/1
0.4 TABLET SUBLINGUAL EVERY 5 MIN PRN
Start: 2024-10-23 | End: 2025-10-23

## 2024-10-23 RX ORDER — HYDROCHLOROTHIAZIDE 12.5 MG/1
12.5 TABLET ORAL DAILY
Qty: 90 TABLET | Refills: 3 | Status: SHIPPED | OUTPATIENT
Start: 2024-10-23 | End: 2025-10-23

## 2024-10-23 NOTE — ASSESSMENT & PLAN NOTE
Amlodipine atenolol to continue.  He is an active man.  Condition controlled currently.  Calcium score greater than 600 in 2011.

## 2024-10-23 NOTE — ASSESSMENT & PLAN NOTE
Repeat labs ordered.  I do not think his triglycerides are high enough to warrant adding fibrate therapy.  LDL well controlled.

## 2024-10-23 NOTE — ASSESSMENT & PLAN NOTE
Nonobstructive atherosclerotic disease noted by Doppler study February 20, 2023.  Condition unchanged.  Stable neurologic status.

## 2024-10-23 NOTE — ASSESSMENT & PLAN NOTE
HCTZ 12.5 mg added for better blood pressure control.  High-dose Lotrel and atenolol 50 mg will be continued.

## 2024-10-23 NOTE — PROGRESS NOTES
[facility]     Subjective:    Patient ID:  Cb Swan is a 64 y.o. male who presents for follow-up of Hypertension, Hyperlipidemia, Diabetes Mellitus, and Coronary Artery Disease    Review of patient's allergies indicates:  No Known Allergies  His history includes angina requiring stenting to circumflex March 20, 2023.  He had calcific disease of RCA and left anterior descending artery but no obstructive disease.  His anginal symptoms were new onset.  He is angina free.  He remains on aspirin and Plavix.    He has upcoming colonoscopy.  He is in need of withdrawing his anti-platelet medication-clopidogrel.  His last LDL was 34, HDL 30, triglycerides 214 mg% in March 2024.  He is compliant with medications.  He is diabetic.  Last A1c 7.1.  He is on metformin Jardiance and Mounjaro.    Hypertension control is reasonably good.  He does see high readings at doctor visits but they improve after he has rested in the clinic.  He does not check his blood pressures at home on a regular basis.  He is compliant with his medications.  He does not smoke.         Review of Systems   Constitutional: Negative for chills, decreased appetite, diaphoresis, fever, malaise/fatigue, night sweats, weight gain and weight loss.   HENT:  Negative for congestion, ear discharge, ear pain, hearing loss, hoarse voice, nosebleeds, odynophagia, sore throat, stridor and tinnitus.    Eyes:  Negative for blurred vision, discharge, double vision, pain, photophobia, redness, vision loss in left eye, vision loss in right eye, visual disturbance and visual halos.   Cardiovascular:  Negative for chest pain, claudication, cyanosis, dyspnea on exertion, irregular heartbeat, leg swelling, near-syncope, orthopnea, palpitations, paroxysmal nocturnal dyspnea and syncope.   Respiratory:  Negative for cough, hemoptysis, shortness of breath, sleep disturbances due to breathing,  "snoring, sputum production and wheezing.    Endocrine: Negative for cold intolerance, heat intolerance, polydipsia, polyphagia and polyuria.   Hematologic/Lymphatic: Negative for adenopathy and bleeding problem. Does not bruise/bleed easily.   Skin:  Negative for color change, dry skin, flushing, itching, nail changes, poor wound healing, rash, skin cancer, suspicious lesions and unusual hair distribution.   Musculoskeletal:  Negative for arthritis, back pain, falls, gout, joint pain, joint swelling, muscle cramps, muscle weakness, myalgias, neck pain and stiffness.   Gastrointestinal:  Negative for bloating, abdominal pain, anorexia, change in bowel habit, bowel incontinence, constipation, diarrhea, dysphagia, excessive appetite, flatus, heartburn, hematemesis, hematochezia, hemorrhoids, jaundice, melena, nausea and vomiting.   Genitourinary:  Negative for bladder incontinence, decreased libido, dysuria, flank pain, frequency, genital sores, hematuria, hesitancy, incomplete emptying, nocturia and urgency.   Neurological:  Negative for aphonia, brief paralysis, difficulty with concentration, disturbances in coordination, excessive daytime sleepiness, dizziness, focal weakness, headaches, light-headedness, loss of balance, numbness, paresthesias, seizures, sensory change, tremors, vertigo and weakness.   Psychiatric/Behavioral:  Negative for altered mental status, depression, hallucinations, memory loss, substance abuse, suicidal ideas and thoughts of violence. The patient does not have insomnia and is not nervous/anxious.    Allergic/Immunologic: Negative for hives and persistent infections.        Objective:       Vitals:    10/23/24 0816   BP: (!) 155/81   Pulse: 71   SpO2: 98%   Weight: 76.5 kg (168 lb 12.2 oz)   Height: 5' 3" (1.6 m)    Physical Exam  Constitutional:       General: He is not in acute distress.     Appearance: He is well-developed. He is not diaphoretic.   HENT:      Head: Normocephalic and " atraumatic.      Nose: Nose normal.   Eyes:      General: No scleral icterus.        Right eye: No discharge.      Conjunctiva/sclera: Conjunctivae normal.      Pupils: Pupils are equal, round, and reactive to light.   Neck:      Thyroid: No thyromegaly.      Vascular: No JVD.      Trachea: No tracheal deviation.   Cardiovascular:      Rate and Rhythm: Normal rate and regular rhythm.      Pulses:           Carotid pulses are 2+ on the right side and 2+ on the left side.       Radial pulses are 2+ on the right side and 2+ on the left side.        Dorsalis pedis pulses are 2+ on the right side and 2+ on the left side.        Posterior tibial pulses are 2+ on the right side and 2+ on the left side.      Heart sounds: Normal heart sounds. No murmur heard.     No friction rub. No gallop.   Pulmonary:      Effort: Pulmonary effort is normal. No respiratory distress.      Breath sounds: Normal breath sounds. No stridor. No wheezing or rales.   Chest:      Chest wall: No tenderness.   Abdominal:      General: Bowel sounds are normal. There is no distension.      Palpations: Abdomen is soft. There is no mass.      Tenderness: There is no abdominal tenderness. There is no guarding or rebound.   Musculoskeletal:         General: No tenderness. Normal range of motion.      Cervical back: Normal range of motion and neck supple.   Lymphadenopathy:      Cervical: No cervical adenopathy.   Skin:     General: Skin is warm and dry.      Coloration: Skin is not pale.      Findings: No erythema or rash.   Neurological:      Mental Status: He is alert and oriented to person, place, and time.      Cranial Nerves: No cranial nerve deficit.      Coordination: Coordination normal.   Psychiatric:         Behavior: Behavior normal.         Thought Content: Thought content normal.         Judgment: Judgment normal.           Assessment:       1. Hyperlipidemia, unspecified hyperlipidemia type    2. Primary hypertension    3. Coronary artery  disease of native artery of native heart with stable angina pectoris    4. Carotid stenosis, asymptomatic, bilateral    5. Mixed hyperlipidemia    6. Type 2 diabetes mellitus with hyperglycemia, without long-term current use of insulin      Results for orders placed or performed in visit on 03/08/24   TSH    Collection Time: 03/08/24 11:50 AM   Result Value Ref Range    TSH 1.185 0.400 - 4.000 uIU/mL   Lipid Panel    Collection Time: 03/08/24 11:50 AM   Result Value Ref Range    Cholesterol 107 (L) 120 - 199 mg/dL    Triglycerides 214 (H) 30 - 150 mg/dL    HDL 30 (L) 40 - 75 mg/dL    LDL Cholesterol 34.2 (L) 63.0 - 159.0 mg/dL    HDL/Cholesterol Ratio 28.0 20.0 - 50.0 %    Total Cholesterol/HDL Ratio 3.6 2.0 - 5.0    Non-HDL Cholesterol 77 mg/dL   C-Peptide    Collection Time: 03/08/24 11:50 AM   Result Value Ref Range    C-Peptide 2.86 0.78 - 5.19 ng/mL   Comprehensive Metabolic Panel    Collection Time: 03/08/24 11:50 AM   Result Value Ref Range    Sodium 142 136 - 145 mmol/L    Potassium 4.5 3.5 - 5.1 mmol/L    Chloride 108 95 - 110 mmol/L    CO2 24 23 - 29 mmol/L    Glucose 127 (H) 70 - 110 mg/dL    BUN 21 8 - 23 mg/dL    Creatinine 1.4 0.5 - 1.4 mg/dL    Calcium 10.1 8.7 - 10.5 mg/dL    Total Protein 7.5 6.0 - 8.4 g/dL    Albumin 4.0 3.5 - 5.2 g/dL    Total Bilirubin 0.5 0.1 - 1.0 mg/dL    Alkaline Phosphatase 79 55 - 135 U/L    AST 16 10 - 40 U/L    ALT 14 10 - 44 U/L    eGFR 55 (A) >60 mL/min/1.73 m^2    Anion Gap 10 8 - 16 mmol/L   Hemoglobin A1C    Collection Time: 03/08/24 11:50 AM   Result Value Ref Range    Hemoglobin A1C 8.7 (H) 4.0 - 5.6 %    Estimated Avg Glucose 203 (H) 68 - 131 mg/dL   Microalbumin/Creatinine Ratio, Urine    Collection Time: 03/08/24 11:50 AM   Result Value Ref Range    Microalbumin, Urine 5.0 ug/mL    Creatinine, Urine 58.9 23.0 - 375.0 mg/dL    Microalb/Creat Ratio 8.5 0.0 - 30.0 ug/mg         Current Outpatient Medications:     amLODIPine-benazepriL (LOTREL) 10-40 mg per capsule,  Take 1 capsule by mouth once daily., Disp: 90 capsule, Rfl: 3    aspirin (ECOTRIN) 81 MG EC tablet, Take 1 tablet (81 mg total) by mouth once daily., Disp: , Rfl: 0    atenoloL (TENORMIN) 50 MG tablet, Take 50 mg by mouth 2 (two) times daily., Disp: , Rfl:     atorvastatin (LIPITOR) 80 MG tablet, TAKE 1 TABLET DAILY, Disp: 90 tablet, Rfl: 3    empagliflozin (JARDIANCE) 10 mg tablet, Take 1 tablet (10 mg total) by mouth once daily., Disp: 90 tablet, Rfl: 3    FREESTYLE PRINCESS 3 PLUS SENSOR Anna, 1 Device by Misc.(Non-Drug; Combo Route) route every 14 (fourteen) days., Disp: 2 each, Rfl: 11    metFORMIN (GLUCOPHAGE-XR) 500 MG ER 24hr tablet, Take 2 tablets (1,000 mg total) by mouth 2 (two) times daily with meals., Disp: 360 tablet, Rfl: 3    MOUNJARO 7.5 mg/0.5 mL PnIj, Inject 7.5 mg into the skin every 7 days., Disp: 4 Pen, Rfl: 11    hydroCHLOROthiazide (HYDRODIURIL) 12.5 MG Tab, Take 1 tablet (12.5 mg total) by mouth once daily., Disp: 90 tablet, Rfl: 3    nitroGLYCERIN (NITROSTAT) 0.4 MG SL tablet, Place 1 tablet (0.4 mg total) under the tongue every 5 (five) minutes as needed for Chest pain., Disp: , Rfl:      Lab Results   Component Value Date    WBC 7.86 03/09/2023    RBC 4.78 03/09/2023    HGB 15.0 03/09/2023    HCT 44.0 03/09/2023    MCV 92 03/09/2023    MCH 31.4 (H) 03/09/2023    MCHC 34.1 03/09/2023    RDW 12.4 03/09/2023     03/09/2023    MPV 9.9 03/09/2023    GRAN 5.2 03/09/2023    GRAN 65.7 03/09/2023    LYMPH 2.0 03/09/2023    LYMPH 24.9 03/09/2023    MONO 0.6 03/09/2023    MONO 7.4 03/09/2023    EOS 0.1 03/09/2023    BASO 0.03 03/09/2023    EOSINOPHIL 1.3 03/09/2023    BASOPHIL 0.4 03/09/2023        CMP  Lab Results   Component Value Date     03/08/2024    K 4.5 03/08/2024     03/08/2024    CO2 24 03/08/2024     (H) 03/08/2024    BUN 21 03/08/2024    CREATININE 1.4 03/08/2024    CALCIUM 10.1 03/08/2024    PROT 7.5 03/08/2024    ALBUMIN 4.0 03/08/2024    BILITOT 0.5 03/08/2024  "   ALKPHOS 79 03/08/2024    AST 16 03/08/2024    ALT 14 03/08/2024    ANIONGAP 10 03/08/2024        No results found for: "LABBLOO", "LABURIN", "RESPIRATORYC", "GSRESP"         Results for orders placed or performed in visit on 03/14/23   EKG 12-lead    Collection Time: 03/14/23 11:13 AM    Narrative    Test Reason : Z98.890,    Vent. Rate : 061 BPM     Atrial Rate : 061 BPM     P-R Int : 138 ms          QRS Dur : 078 ms      QT Int : 402 ms       P-R-T Axes : 070 035 051 degrees     QTc Int : 404 ms    Normal sinus rhythm  Cannot rule out Anterior infarct ,age undetermined  Abnormal ECG  When compared with ECG of 14-MAR-2023 07:31,  No significant change was found  Confirmed by Alan PALOMARES MD, Jabier ESPINOZA ([id number]) on 3/14/2023 2:48:09 PM    Referred By: System System           Confirmed By:Jabier Phillips III, MD                 Hyperlipidemia, unspecified hyperlipidemia type  -     Lipid Panel; Future; Expected date: 10/23/2024    Primary hypertension  Assessment & Plan:  HCTZ 12.5 mg added for better blood pressure control.  High-dose Lotrel and atenolol 50 mg will be continued.    Orders:  -     hydroCHLOROthiazide (HYDRODIURIL) 12.5 MG Tab; Take 1 tablet (12.5 mg total) by mouth once daily.  Dispense: 90 tablet; Refill: 3    Coronary artery disease of native artery of native heart with stable angina pectoris  Assessment & Plan:  Amlodipine atenolol to continue.  He is an active man.  Condition controlled currently.  Calcium score greater than 600 in 2011.       Carotid stenosis, asymptomatic, bilateral  Assessment & Plan:  Nonobstructive atherosclerotic disease noted by Doppler study February 20, 2023.  Condition unchanged.  Stable neurologic status.      Mixed hyperlipidemia  Assessment & Plan:  Repeat labs ordered.  I do not think his triglycerides are high enough to warrant adding fibrate therapy.  LDL well controlled.      Type 2 diabetes mellitus with hyperglycemia, without long-term current use of " insulin  Assessment & Plan:  Condition stable.      Other orders  -     nitroGLYCERIN (NITROSTAT) 0.4 MG SL tablet; Place 1 tablet (0.4 mg total) under the tongue every 5 (five) minutes as needed for Chest pain.       Plan:       Problem List Items Addressed This Visit          Cardiac/Vascular    Coronary artery disease of native artery of native heart with stable angina pectoris     Amlodipine atenolol to continue.  He is an active man.  Condition controlled currently.  Calcium score greater than 600 in 2011.          Hypertension     HCTZ 12.5 mg added for better blood pressure control.  High-dose Lotrel and atenolol 50 mg will be continued.         Relevant Medications    hydroCHLOROthiazide (HYDRODIURIL) 12.5 MG Tab    Carotid stenosis, asymptomatic, bilateral     Nonobstructive atherosclerotic disease noted by Doppler study February 20, 2023.  Condition unchanged.  Stable neurologic status.         Mixed hyperlipidemia     Repeat labs ordered.  I do not think his triglycerides are high enough to warrant adding fibrate therapy.  LDL well controlled.            Endocrine    Type 2 diabetes mellitus with hyperglycemia, without long-term current use of insulin     Condition stable.          Other Visit Diagnoses       Hyperlipidemia, unspecified hyperlipidemia type    -  Primary    Relevant Orders    Lipid Panel               HCTZ added for better blood pressure control.  I discontinued clopidogrel.  He is cleared for upcoming colonoscopy.    We reviewed his catheterization films.  Follow-up cholesterol profile recommended.  LDL has been well controlled on his current regimen-atorvastatin 80 mg daily.    The patient is cleared from a cardiac standpoint for upcoming colonoscopy.    I advised a 1 year follow-up.             Stevan Sandoval MD  10/23/2024   8:41 AM

## 2024-11-20 ENCOUNTER — TELEPHONE (OUTPATIENT)
Dept: CARDIOLOGY | Facility: CLINIC | Age: 64
End: 2024-11-20
Payer: MEDICARE

## 2024-11-20 NOTE — TELEPHONE ENCOUNTER
----- Message from Stevan Sandoval MD sent at 2024 12:20 PM CST -----  Contact: [facility]  My last note covered clearance for colonoscopy  ----- Message -----  From: Gauri Jones MA  Sent: 2024   8:06 AM CST  To: Stevan Sandoval MD      ----- Message -----  From: Elisha Slater RN  Sent: 2024   4:37 PM CST  To: [facility] Card[facility] Clinical Staff      ----- Message -----  From: Wandy Gonzalez  Sent: 2024   3:09 PM CST  To: [facility] Staff    Cb Swan  MRN: [id number]  : 1960  PCP: Steven Bernstein.  Home Phone      Not on file.  Work Phone      Not on file.  Mobile          [phone]      MESSAGE: Geena is needing the chart note that states that the patient stopped his blood thinner.  He is wanting to have a Colonoscopy done.  It can be faxed to [phone].        Phone: [phone]

## 2025-01-08 ENCOUNTER — OFFICE VISIT (OUTPATIENT)
Dept: INTERNAL MEDICINE | Facility: CLINIC | Age: 65
End: 2025-01-08
Payer: MEDICARE

## 2025-01-08 VITALS
OXYGEN SATURATION: 98 % | HEIGHT: 63 IN | BODY MASS INDEX: 28.75 KG/M2 | RESPIRATION RATE: 16 BRPM | HEART RATE: 78 BPM | DIASTOLIC BLOOD PRESSURE: 80 MMHG | SYSTOLIC BLOOD PRESSURE: 130 MMHG | WEIGHT: 162.25 LBS

## 2025-01-08 DIAGNOSIS — I25.118 CORONARY ARTERY DISEASE OF NATIVE ARTERY OF NATIVE HEART WITH STABLE ANGINA PECTORIS: ICD-10-CM

## 2025-01-08 DIAGNOSIS — I10 PRIMARY HYPERTENSION: ICD-10-CM

## 2025-01-08 DIAGNOSIS — Z12.5 SCREENING FOR PROSTATE CANCER: ICD-10-CM

## 2025-01-08 DIAGNOSIS — D69.2 OTHER NONTHROMBOCYTOPENIC PURPURA: ICD-10-CM

## 2025-01-08 DIAGNOSIS — E11.69 TYPE 2 DIABETES MELLITUS WITH OTHER SPECIFIED COMPLICATION, WITHOUT LONG-TERM CURRENT USE OF INSULIN: Primary | ICD-10-CM

## 2025-01-08 DIAGNOSIS — E11.65 TYPE 2 DIABETES MELLITUS WITH HYPERGLYCEMIA, WITHOUT LONG-TERM CURRENT USE OF INSULIN: ICD-10-CM

## 2025-01-08 DIAGNOSIS — Z12.11 SPECIAL SCREENING FOR MALIGNANT NEOPLASMS, COLON: ICD-10-CM

## 2025-01-08 PROCEDURE — G2211 COMPLEX E/M VISIT ADD ON: HCPCS | Mod: S$PBB | Performed by: INTERNAL MEDICINE

## 2025-01-08 PROCEDURE — 99204 OFFICE O/P NEW MOD 45 MIN: CPT | Mod: S$PBB | Performed by: INTERNAL MEDICINE

## 2025-01-08 PROCEDURE — 99214 OFFICE O/P EST MOD 30 MIN: CPT | Mod: PBBFAC | Performed by: INTERNAL MEDICINE

## 2025-01-08 PROCEDURE — 99999 PR PBB SHADOW E&M-EST. PATIENT-LVL IV: CPT | Mod: PBBFAC,,, | Performed by: INTERNAL MEDICINE

## 2025-01-08 PROCEDURE — 99999 PR STA SHADOW: CPT | Mod: PBBFAC,,, | Performed by: INTERNAL MEDICINE

## 2025-01-08 NOTE — PROGRESS NOTES
"Subjective:   History of Present Illness    CHIEF COMPLAINT:    NEW PATIENT :  Needs to establish .    Cb presents today for follow-up regarding colonoscopy and medication management.    GASTROINTESTINAL:  He reports discomfort after eating and occasional sensation of "locking up" that resolves with belching. He is past due for a colonoscopy and requests both upper and lower endoscopy procedures.    DIABETES:  His A1c has increased from 7.1 to 8.7 as of March 8, 2024. He continues Metformin 500 mg 4 times daily and Mounjaro.    CARDIOVASCULAR:  He had stent placement one year ago requiring anticoagulants which have since been discontinued. He reports good blood pressure readings with home monitoring.    MUSCULOSKELETAL:  He experienced a recent episode of knee pain. 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       Review of Systems   Constitutional:  Negative for chills and fever.   HENT:  Negative for congestion, postnasal drip and sore throat.    Eyes:  Negative for photophobia.   Respiratory:  Negative for chest tightness and shortness of breath.    Cardiovascular:  Negative for chest pain.   Gastrointestinal:  Negative for abdominal distention, abdominal pain, blood in stool and vomiting.   Genitourinary:  Negative for dysuria, flank pain and hematuria.   Musculoskeletal:  Negative for back pain.   Skin:  Negative for pallor.   Neurological:  Negative for dizziness, seizures, facial asymmetry, speech difficulty and numbness.   Hematological:  Does not bruise/bleed easily.   Psychiatric/Behavioral:  Negative for agitation and suicidal ideas. The patient is not nervous/anxious.       Objective:   Physical Exam  Vitals and nursing note reviewed.   Constitutional:       Appearance: He is well-developed.   HENT:      Head: Normocephalic and atraumatic.      Nose: " "Nose normal.   Eyes:      Conjunctiva/sclera: Conjunctivae normal.      Pupils: Pupils are equal, round, and reactive to light.   Neck:      Thyroid: No thyromegaly.      Vascular: No JVD.   Cardiovascular:      Rate and Rhythm: Normal rate and regular rhythm.      Heart sounds: Normal heart sounds.   Pulmonary:      Effort: Pulmonary effort is normal.      Breath sounds: Normal breath sounds.   Abdominal:      General: Bowel sounds are normal. There is no distension.      Palpations: Abdomen is soft. There is no mass.      Tenderness: There is no abdominal tenderness. There is no guarding.   Musculoskeletal:         General: Normal range of motion.      Cervical back: Normal range of motion and neck supple.   Lymphadenopathy:      Cervical: No cervical adenopathy.   Skin:     General: Skin is warm and dry.      Coloration: Skin is not pale.      Findings: No rash.   Neurological:      Mental Status: He is alert and oriented to person, place, and time.      Cranial Nerves: No cranial nerve deficit.      Deep Tendon Reflexes: Reflexes are normal and symmetric.        Assessment/Plan:     1. Type 2 diabetes mellitus with other specified complication, without long-term current use of insulin  CBC Auto Differential    Comprehensive Metabolic Panel    Lipid Panel    TSH    Hemoglobin A1C      2. Other nonthrombocytopenic purpura  CBC Auto Differential      3. Type 2 diabetes mellitus with hyperglycemia, without long-term current use of insulin        4. Coronary artery disease of native artery of native heart with stable angina pectoris  Lipid Panel      5. Special screening for malignant neoplasms, colon  Colonoscopy      6. Screening for prostate cancer  PSA, Screening      7. Primary hypertension           Assessment & Plan    IMPRESSION:  - Assessed need for colonoscopy, previously recommended by Dr. Steven Purvis  - Evaluated GI symptoms, including discomfort after taking medications and occasional feeling of "locking " "up" after eating  - Decided against combined upper and lower endoscopy due to specialization limitations of available colorectal specialists  - Reviewed diabetes management, noting previous A1c of 8.7 in March 2024  - Assessed cardiovascular risk factors, including blood pressure and cholesterol    DIABETES:  - Monitored the patient's A1c levels, noting the last result was 8.7 on March 8, 2024, and previously 7.1 with Dr. Peres.  - Continued the patient's current regimen of Metformin 500mg, 4 times daily, split into two doses in the morning and two in the evening.  - Ordered comprehensive labs including A1c, cholesterol, kidney function, liver function, and thyroid tests to evaluate current diabetes management.  - Continued Mounjaro for diabetes management.  - Continued Metformin 500mg, 2 tablets twice daily, noting the patient has been on this medication for many years.  - Continued Mounjaro as a weekly injection for diabetes management.    CARDIOVASCULAR DISEASE:  - Noted that the patient had a stent placed about 1 year ago by Dr. Sadnoval.  - Confirmed that the patient was on anticoagulants due to stent placement.    CANCER SCREENING:  - Ordered a colonoscopy for cancer screening.  - Discussed the importance of discontinuing Mounjaro 1 week prior to colonoscopy due to anesthesia risks.  - Instructed the patient to discontinue Mounjaro for 1 week prior to the scheduled colonoscopy.  - Advised the patient to wait for a call from the colonoscopy specialist's nurse to schedule the procedure.  - Explained the colonoscopy procedure and preparation to the patient.    LABS:  - Explained the necessity of fasting before labs.  - Instructed the patient to get labs done on the weekend around 7:30-8:00 AM at the hospital.    FOLLOW UP:  - Scheduled a follow-up visit in 1-2 weeks after labs to review results.          This note was generated with the assistance of ambient listening technology. Verbal consent was obtained by " the patient and accompanying visitor(s) for the recording of patient appointment to facilitate this note. I attest to having reviewed and edited the generated note for accuracy, though some syntax or spelling errors may persist. Please contact the author of this note for any clarification.

## 2025-01-10 ENCOUNTER — LAB VISIT (OUTPATIENT)
Dept: LAB | Facility: HOSPITAL | Age: 65
End: 2025-01-10
Attending: INTERNAL MEDICINE
Payer: MEDICARE

## 2025-01-10 DIAGNOSIS — I25.118 CORONARY ARTERY DISEASE OF NATIVE ARTERY OF NATIVE HEART WITH STABLE ANGINA PECTORIS: ICD-10-CM

## 2025-01-10 DIAGNOSIS — E11.69 TYPE 2 DIABETES MELLITUS WITH OTHER SPECIFIED COMPLICATION, WITHOUT LONG-TERM CURRENT USE OF INSULIN: ICD-10-CM

## 2025-01-10 DIAGNOSIS — Z12.5 SCREENING FOR PROSTATE CANCER: ICD-10-CM

## 2025-01-10 DIAGNOSIS — D69.2 OTHER NONTHROMBOCYTOPENIC PURPURA: ICD-10-CM

## 2025-01-10 LAB
ALBUMIN SERPL BCP-MCNC: 3.8 G/DL (ref 3.5–5.2)
ALP SERPL-CCNC: 69 U/L (ref 40–150)
ALT SERPL W/O P-5'-P-CCNC: 9 U/L (ref 10–44)
ANION GAP SERPL CALC-SCNC: 7 MMOL/L (ref 8–16)
AST SERPL-CCNC: 11 U/L (ref 10–40)
BASOPHILS # BLD AUTO: 0.05 K/UL (ref 0–0.2)
BASOPHILS NFR BLD: 0.7 % (ref 0–1.9)
BILIRUB SERPL-MCNC: 0.4 MG/DL (ref 0.1–1)
BUN SERPL-MCNC: 20 MG/DL (ref 8–23)
CALCIUM SERPL-MCNC: 9.6 MG/DL (ref 8.7–10.5)
CHLORIDE SERPL-SCNC: 108 MMOL/L (ref 95–110)
CHOLEST SERPL-MCNC: 103 MG/DL (ref 120–199)
CHOLEST/HDLC SERPL: 3.6 {RATIO} (ref 2–5)
CO2 SERPL-SCNC: 28 MMOL/L (ref 23–29)
COMPLEXED PSA SERPL-MCNC: 2.8 NG/ML (ref 0–4)
CREAT SERPL-MCNC: 1.3 MG/DL (ref 0.5–1.4)
DIFFERENTIAL METHOD BLD: ABNORMAL
EOSINOPHIL # BLD AUTO: 0.1 K/UL (ref 0–0.5)
EOSINOPHIL NFR BLD: 1.4 % (ref 0–8)
ERYTHROCYTE [DISTWIDTH] IN BLOOD BY AUTOMATED COUNT: 12 % (ref 11.5–14.5)
EST. GFR  (NO RACE VARIABLE): >60 ML/MIN/1.73 M^2
ESTIMATED AVG GLUCOSE: 143 MG/DL (ref 68–131)
GLUCOSE SERPL-MCNC: 151 MG/DL (ref 70–110)
HBA1C MFR BLD: 6.6 % (ref 4–5.6)
HCT VFR BLD AUTO: 41.8 % (ref 40–54)
HDLC SERPL-MCNC: 29 MG/DL (ref 40–75)
HDLC SERPL: 28.2 % (ref 20–50)
HGB BLD-MCNC: 14.5 G/DL (ref 14–18)
IMM GRANULOCYTES # BLD AUTO: 0.03 K/UL (ref 0–0.04)
IMM GRANULOCYTES NFR BLD AUTO: 0.4 % (ref 0–0.5)
LDLC SERPL CALC-MCNC: 50.8 MG/DL (ref 63–159)
LYMPHOCYTES # BLD AUTO: 1.7 K/UL (ref 1–4.8)
LYMPHOCYTES NFR BLD: 24.2 % (ref 18–48)
MCH RBC QN AUTO: 31.3 PG (ref 27–31)
MCHC RBC AUTO-ENTMCNC: 34.7 G/DL (ref 32–36)
MCV RBC AUTO: 90 FL (ref 82–98)
MONOCYTES # BLD AUTO: 0.6 K/UL (ref 0.3–1)
MONOCYTES NFR BLD: 7.9 % (ref 4–15)
NEUTROPHILS # BLD AUTO: 4.5 K/UL (ref 1.8–7.7)
NEUTROPHILS NFR BLD: 65.4 % (ref 38–73)
NONHDLC SERPL-MCNC: 74 MG/DL
NRBC BLD-RTO: 0 /100 WBC
PLATELET # BLD AUTO: 190 K/UL (ref 150–450)
PMV BLD AUTO: 10.6 FL (ref 9.2–12.9)
POTASSIUM SERPL-SCNC: 4.6 MMOL/L (ref 3.5–5.1)
PROT SERPL-MCNC: 7.2 G/DL (ref 6–8.4)
RBC # BLD AUTO: 4.63 M/UL (ref 4.6–6.2)
SODIUM SERPL-SCNC: 143 MMOL/L (ref 136–145)
TRIGL SERPL-MCNC: 116 MG/DL (ref 30–150)
TSH SERPL DL<=0.005 MIU/L-ACNC: 1.09 UIU/ML (ref 0.4–4)
WBC # BLD AUTO: 6.93 K/UL (ref 3.9–12.7)

## 2025-01-10 PROCEDURE — 85025 COMPLETE CBC W/AUTO DIFF WBC: CPT | Performed by: INTERNAL MEDICINE

## 2025-01-10 PROCEDURE — 83036 HEMOGLOBIN GLYCOSYLATED A1C: CPT | Performed by: INTERNAL MEDICINE

## 2025-01-10 PROCEDURE — 80061 LIPID PANEL: CPT | Performed by: INTERNAL MEDICINE

## 2025-01-10 PROCEDURE — 84443 ASSAY THYROID STIM HORMONE: CPT | Performed by: INTERNAL MEDICINE

## 2025-01-10 PROCEDURE — 36415 COLL VENOUS BLD VENIPUNCTURE: CPT | Performed by: INTERNAL MEDICINE

## 2025-01-10 PROCEDURE — 84153 ASSAY OF PSA TOTAL: CPT | Performed by: INTERNAL MEDICINE

## 2025-01-10 PROCEDURE — 80053 COMPREHEN METABOLIC PANEL: CPT | Performed by: INTERNAL MEDICINE

## 2025-01-15 ENCOUNTER — OFFICE VISIT (OUTPATIENT)
Dept: INTERNAL MEDICINE | Facility: CLINIC | Age: 65
End: 2025-01-15
Payer: MEDICARE

## 2025-01-15 VITALS
BODY MASS INDEX: 29.61 KG/M2 | HEART RATE: 65 BPM | DIASTOLIC BLOOD PRESSURE: 70 MMHG | WEIGHT: 167.13 LBS | SYSTOLIC BLOOD PRESSURE: 130 MMHG | HEIGHT: 63 IN | OXYGEN SATURATION: 98 % | RESPIRATION RATE: 16 BRPM

## 2025-01-15 DIAGNOSIS — I10 PRIMARY HYPERTENSION: Primary | ICD-10-CM

## 2025-01-15 DIAGNOSIS — E78.2 MIXED HYPERLIPIDEMIA: ICD-10-CM

## 2025-01-15 DIAGNOSIS — E11.69 TYPE 2 DIABETES MELLITUS WITH OTHER SPECIFIED COMPLICATION, WITHOUT LONG-TERM CURRENT USE OF INSULIN: ICD-10-CM

## 2025-01-15 PROCEDURE — G2211 COMPLEX E/M VISIT ADD ON: HCPCS | Mod: S$PBB | Performed by: INTERNAL MEDICINE

## 2025-01-15 PROCEDURE — 99999 PR STA SHADOW: CPT | Mod: PBBFAC,,, | Performed by: INTERNAL MEDICINE

## 2025-01-15 PROCEDURE — 99213 OFFICE O/P EST LOW 20 MIN: CPT | Mod: S$PBB | Performed by: INTERNAL MEDICINE

## 2025-01-15 PROCEDURE — 99999 PR PBB SHADOW E&M-EST. PATIENT-LVL IV: CPT | Mod: PBBFAC,,, | Performed by: INTERNAL MEDICINE

## 2025-01-15 PROCEDURE — 99214 OFFICE O/P EST MOD 30 MIN: CPT | Mod: PBBFAC | Performed by: INTERNAL MEDICINE

## 2025-01-15 NOTE — PROGRESS NOTES
Subjective:   History of Present Illness    CHIEF COMPLAINT:  Cb presents today for follow-up and review of labs.    LABORATORY RESULTS:  CBC shows normal white count, hemoglobin of 14, and normal platelets. Chemical profile reveals sodium of 143, potassium of 4.6, and improved kidney function. Liver tests are within normal limits. Cholesterol is 103. A1c has improved to 6.6 from 8.7 last year. Thyroid and prostate tests are normal.    CARDIOVASCULAR:  He expresses concern about his low HDL cholesterol level noted on recent lab results.    MEDICATIONS:  He reports medication compliance and denies need for prescription refills.       Review of Systems   Constitutional:  Negative for chills and fever.   HENT:  Negative for congestion, postnasal drip and sore throat.    Eyes:  Negative for photophobia.   Respiratory:  Negative for chest tightness and shortness of breath.    Cardiovascular:  Negative for chest pain.   Gastrointestinal:  Negative for abdominal distention, abdominal pain, blood in stool and vomiting.   Genitourinary:  Negative for dysuria, flank pain and hematuria.   Musculoskeletal:  Negative for back pain.   Skin:  Negative for pallor.   Neurological:  Negative for dizziness, seizures, facial asymmetry, speech difficulty and numbness.   Hematological:  Does not bruise/bleed easily.   Psychiatric/Behavioral:  Negative for agitation and suicidal ideas. The patient is not nervous/anxious.       Objective:   Physical Exam  Vitals and nursing note reviewed.   Constitutional:       Appearance: He is well-developed.   HENT:      Head: Normocephalic and atraumatic.      Nose: Nose normal.   Eyes:      Conjunctiva/sclera: Conjunctivae normal.      Pupils: Pupils are equal, round, and reactive to light.   Neck:      Thyroid: No thyromegaly.      Vascular: No JVD.   Cardiovascular:      Rate and Rhythm: Normal rate and regular rhythm.      Heart sounds: Normal heart sounds.   Pulmonary:      Effort: Pulmonary  effort is normal.      Breath sounds: Normal breath sounds.   Abdominal:      General: Bowel sounds are normal. There is no distension.      Palpations: Abdomen is soft. There is no mass.      Tenderness: There is no abdominal tenderness. There is no guarding.   Musculoskeletal:         General: Normal range of motion.      Cervical back: Normal range of motion and neck supple.   Lymphadenopathy:      Cervical: No cervical adenopathy.   Skin:     General: Skin is warm and dry.      Coloration: Skin is not pale.      Findings: No rash.   Neurological:      Mental Status: He is alert and oriented to person, place, and time.      Cranial Nerves: No cranial nerve deficit.      Deep Tendon Reflexes: Reflexes are normal and symmetric.        Assessment/Plan:     1. Primary hypertension  CBC Auto Differential    Comprehensive Metabolic Panel      2. Mixed hyperlipidemia  Lipid Panel    TSH      3. Type 2 diabetes mellitus with other specified complication, without long-term current use of insulin  Hemoglobin A1C    Microalbumin/Creatinine Ratio, Urine         Assessment & Plan    IMPRESSION:  - Reviewed recent labs results:  - - CBC, chemical profile, kidney function, liver tests, cholesterol, A1c, thyroid, and prostate tests all within normal ranges  - - Kidney function improved from previous low (55 in March last year)  - - A1c improved from 8.7 in March last year to 6.6  - Overall assessment: Patient's lab results show significant improvement and are within normal ranges    DIABETES:  - Performed A1c test to monitor diabetes.  - A1c result is 6.6, which has improved from 8.7 in March last year.  - Explained significance of improved A1c levels in relation to diabetes management.  - Acknowledged improvement in A1c levels.  - Scheduled follow-up visit in 6 months.    HYPERLIPIDEMIA:  - Checked cholesterol levels as part of the labs.  - Cholesterol level is 103, which is very good.  - Cb reports low HDL cholesterol.  -  Discussed importance of maintaining good cholesterol levels.  - Provided dietary advice to increase HDL cholesterol.  - Cb to eat more nuts to improve good cholesterol levels.    COLORECTAL CANCER SCREENING:  - Confirmed previous order for screening colonoscopy.  - Instructed the patient to contact the office for colonoscopy scheduling if not contacted by the department soon.  - Directed nurse to provide contact number for scheduling.    LABS:  - Performed comprehensive labs including CBC, chemical profile, kidney function, liver tests, cholesterol, A1c, thyroid test, and prostate test.  - All test results were within normal limits: CBC normal, hemoglobin 14, platelets good, sodium 143, potassium 4.6, kidney function normal, liver tests good, cholesterol 103, A1c 6.6, thyroid and prostate tests good.    OTHER INSTRUCTIONS:  - Checked vital signs: pulse 65, blood pressure 130/70, SpO2 98%.  - Assessed overall health as good.    FOLLOW UP:  - Scheduled follow-up visit in 6 months.  - Instructed the patient to complete labs prior to next appointment.          This note was generated with the assistance of ambient listening technology. Verbal consent was obtained by the patient and accompanying visitor(s) for the recording of patient appointment to facilitate this note. I attest to having reviewed and edited the generated note for accuracy, though some syntax or spelling errors may persist. Please contact the author of this note for any clarification.

## 2025-01-24 RX ORDER — ATORVASTATIN CALCIUM 80 MG/1
80 TABLET, FILM COATED ORAL
Qty: 90 TABLET | Refills: 3 | Status: SHIPPED | OUTPATIENT
Start: 2025-01-24

## 2025-02-14 ENCOUNTER — OFFICE VISIT (OUTPATIENT)
Dept: ENDOCRINOLOGY | Facility: CLINIC | Age: 65
End: 2025-02-14
Payer: MEDICARE

## 2025-02-14 VITALS
DIASTOLIC BLOOD PRESSURE: 72 MMHG | WEIGHT: 167.31 LBS | HEIGHT: 63 IN | BODY MASS INDEX: 29.64 KG/M2 | SYSTOLIC BLOOD PRESSURE: 120 MMHG

## 2025-02-14 DIAGNOSIS — Z79.4 TYPE 2 DIABETES MELLITUS WITH HYPERGLYCEMIA, WITH LONG-TERM CURRENT USE OF INSULIN: ICD-10-CM

## 2025-02-14 DIAGNOSIS — E11.65 TYPE 2 DIABETES MELLITUS WITH HYPERGLYCEMIA, WITHOUT LONG-TERM CURRENT USE OF INSULIN: Primary | ICD-10-CM

## 2025-02-14 DIAGNOSIS — E78.2 MIXED HYPERLIPIDEMIA: ICD-10-CM

## 2025-02-14 DIAGNOSIS — E11.65 TYPE 2 DIABETES MELLITUS WITH HYPERGLYCEMIA, WITH LONG-TERM CURRENT USE OF INSULIN: ICD-10-CM

## 2025-02-14 PROCEDURE — 99213 OFFICE O/P EST LOW 20 MIN: CPT | Mod: PBBFAC | Performed by: STUDENT IN AN ORGANIZED HEALTH CARE EDUCATION/TRAINING PROGRAM

## 2025-02-14 PROCEDURE — 99999 PR PBB SHADOW E&M-EST. PATIENT-LVL III: CPT | Mod: PBBFAC,,, | Performed by: STUDENT IN AN ORGANIZED HEALTH CARE EDUCATION/TRAINING PROGRAM

## 2025-02-14 RX ORDER — EMPAGLIFLOZIN 10 MG/1
10 TABLET, FILM COATED ORAL DAILY
Qty: 90 TABLET | Refills: 3 | Status: SHIPPED | OUTPATIENT
Start: 2025-02-14

## 2025-02-14 RX ORDER — LINAGLIPTIN 5 MG/1
1 TABLET, FILM COATED ORAL DAILY
COMMUNITY
End: 2025-02-14

## 2025-02-14 RX ORDER — METFORMIN HYDROCHLORIDE 500 MG/1
1000 TABLET, EXTENDED RELEASE ORAL 2 TIMES DAILY WITH MEALS
Qty: 360 TABLET | Refills: 3 | Status: SHIPPED | OUTPATIENT
Start: 2025-02-14

## 2025-02-14 NOTE — ASSESSMENT & PLAN NOTE
Glucose overall controlled with TIR > 60% and average glucose 170s on CGM. Hyperglycemia variable depending on intake. He admits to dietary indiscretion, counseled on this. No hypoglycemia.    Last A1c 6.6%    Continue same regimen      Plan  - Continue Mounjaro 7.5 mg weekly  - Continue Jardiance 10 mg daily  - Continue Metformin 1,000 mg BID  - Continue FreeStyle Deanna 3 Plus    Labs per PCP    F/u 6 months

## 2025-02-14 NOTE — PROGRESS NOTES
"Subjective:      Patient ID: Cb Swan is a 65 y.o. male.    Chief Complaint:  Type 2 diabetes mellitus    History of Present Illness  This is a 65 y.o. male. with a past medical history of Type 2 diabetes mellitus, BMI 31, CAD s/p PCI, HTN, HLD here for evaluation.    Type 2 diabetes mellitus  Diagnosed around age 53    Current diabetes medications:  - Metformin 1,000 mg BID  - Jardiance 10 mg daily  - Mounjaro 7.5 mg weekly    Past diabetes medications:  - Tradjenta    Lab Results   Component Value Date    CREATININE 1.3 01/10/2025    EGFRNORACEVR >60 01/10/2025       Known diabetic complications: cardiovascular disease    Weight trend:  Wt Readings from Last 8 Encounters:   02/14/25 75.9 kg (167 lb 4.8 oz)   01/15/25 75.8 kg (167 lb 1.7 oz)   01/08/25 73.6 kg (162 lb 4.1 oz)   10/23/24 76.5 kg (168 lb 12.2 oz)   03/08/24 80.3 kg (177 lb)   07/24/23 79.3 kg (174 lb 13.2 oz)   04/06/23 77.1 kg (170 lb)   03/23/23 77.5 kg (170 lb 13.7 oz)       Family history of diabetes:  Yes      Blood glucose monitoring at home:             Outside labs  9/26/24  A1c 7.1%  Glucose 139  Cr 1.16, GFR > 60  LDL 61, HDL 39, Tg 111  TSH 1.44        Diabetes Management Status  Statin: Taking  ACE/ARB: Not taking    Screening or Prevention Patient's value   HgA1C Testing and Control   Lab Results   Component Value Date    HGBA1C 6.6 (H) 01/10/2025        LDL control Lab Results   Component Value Date    LDLCALC 50.8 (L) 01/10/2025      Nephropathy screening Lab Results   Component Value Date    MICALBCREAT 8.5 03/08/2024        Lab Results   Component Value Date    TSH 1.095 01/10/2025       Last eye exam: : 11/11/2014      Review of Systems  As above    Social and family history reviewed  Current medications and allergies reviewed    Objective:   /72   Ht 5' 3" (1.6 m)   Wt 75.9 kg (167 lb 4.8 oz)   BMI 29.64 kg/m²   Physical Exam  Alert, oriented    BP Readings from Last 1 Encounters:   02/14/25 120/72      Wt Readings " from Last 1 Encounters:   02/14/25 1049 75.9 kg (167 lb 4.8 oz)     Body mass index is 29.64 kg/m².    Lab Review:   Lab Results   Component Value Date    HGBA1C 6.6 (H) 01/10/2025     Lab Results   Component Value Date    CHOL 103 (L) 01/10/2025    HDL 29 (L) 01/10/2025    LDLCALC 50.8 (L) 01/10/2025    TRIG 116 01/10/2025    CHOLHDL 28.2 01/10/2025     Lab Results   Component Value Date     01/10/2025    K 4.6 01/10/2025     01/10/2025    CO2 28 01/10/2025     (H) 01/10/2025    BUN 20 01/10/2025    CREATININE 1.3 01/10/2025    CALCIUM 9.6 01/10/2025    PROT 7.2 01/10/2025    ALBUMIN 3.8 01/10/2025    BILITOT 0.4 01/10/2025    ALKPHOS 69 01/10/2025    AST 11 01/10/2025    ALT 9 (L) 01/10/2025    ANIONGAP 7 (L) 01/10/2025    TSH 1.095 01/10/2025       All pertinent labs reviewed    Assessment and Plan     Type 2 diabetes mellitus with hyperglycemia, without long-term current use of insulin  Glucose overall controlled with TIR > 60% and average glucose 170s on CGM. Hyperglycemia variable depending on intake. He admits to dietary indiscretion, counseled on this. No hypoglycemia.    Last A1c 6.6%    Continue same regimen      Plan  - Continue Mounjaro 7.5 mg weekly  - Continue Jardiance 10 mg daily  - Continue Metformin 1,000 mg BID  - Continue FreeStyle Deanna 3 Plus    Labs per PCP    F/u 6 months      BMI 29.0-29.9,adult  Continue GLP-1/GIP RA given weight loss benefit    Mixed hyperlipidemia  Continue statin  Monitored by PCP and Cardiology        Isaiah Gongora MD  Endocrinology

## 2025-03-27 ENCOUNTER — PATIENT OUTREACH (OUTPATIENT)
Dept: ADMINISTRATIVE | Facility: HOSPITAL | Age: 65
End: 2025-03-27
Payer: MEDICARE

## 2025-04-02 ENCOUNTER — HOSPITAL ENCOUNTER (OUTPATIENT)
Dept: PREADMISSION TESTING | Facility: HOSPITAL | Age: 65
Discharge: HOME | End: 2025-04-02
Attending: STUDENT IN AN ORGANIZED HEALTH CARE EDUCATION/TRAINING PROGRAM
Payer: MEDICARE

## 2025-04-02 NOTE — DISCHARGE INSTRUCTIONS
Colonoscopy Prep Instructions      Date: Thursday 4/17/25  Arrival time:       IMPORTANT: PLEASE READ YOUR INSTRUCTIONS CAREFULLY. FAILURE TO FOLLOW THESE INSTRUCTIONS MAY RESULT IN YOUR PROCEURE BEING CANCELED,RESCHEDULED, OR REPEATED.    Starting one week before your colonoscopy: 4/10/25)--start eating a low/no fiber diet.  Avoid nuts, corn, seeded veggies, beans, raw veggies, and meats that are harder to digest.  Poultry and fish are preferable to beef and pork.  Make sure your veggies are cooked well.  Breads/rice/noodles--all white is better.  MAKE SURE YOU ARE HYDRATING WELL!.         The day before your procedure: Wednesday (4/17/25)  Upon awakening begin the clear liquid diet. DO NOT EAT SOLID FOODS     Clear Liquid Diet---- you may have any of the following:     Water, tea, coffee (decaffeinated or regular)     Soft drinks (regular or sugar free)     Gelatin dessert (plain or flavored)     Juice, Gatorade, Powerade, Crystal Lite, lemonade, limeade, Oleg-Aid     Bouillon, clear consommé, 100% fat free beef, chicken, or vegetable broths     Snowballs, popsicles     100% cranberry juice is the only red liquid allowed     Please Avoid the following:     Anything with RED dye     Liquids not specifically listed     Dairy (liquid and powder)     Creamers (liquid and powder)     Alcohol       Drink at least 6-8 glasses of clear liquids until you begin your prep       At 1:00 pm-- You will take four (4) Dulcolax laxative with a bottle of water.  Typically, within 2-3 hours, you will have 1-2 large bowel movements.      At 4:00 pm: Pour one 6-ounce bottle of Suprep into the mixing container                          Add water to the 16-ounce line on the container and mix                Drink all the mixed prep plus 2 more 16-ounce containers of clear liquid within 2 hours    It is common to experience abdominal cramping, nausea, and vomiting when taking the prep. If you have nausea and/or vomiting while taking the  prep, stop drinking for 20-30 minutes then resume.      You may continue with the clear liquids after this step and through the night.          On Thursday morning, FIVE HOURS BEFORE YOUR ARRIVAL AT ______________________AM           Pour one 6-ounce bottle of Suprep into the mixing container                               Add water to the 16-ounce line on the container and mix                                Drink all the mixed prep plus 2 more 16-ounce containers of clear liquid within 2 hours      You must complete your morning prep 2 hours prior to your given arrival time.  No more liquids after you're done.      Before arrival, with a sip of water, take _____________________________    YOU MUST HAVE A  HOME!

## 2025-04-17 ENCOUNTER — ANESTHESIA (OUTPATIENT)
Dept: ENDOSCOPY | Facility: HOSPITAL | Age: 65
End: 2025-04-17
Payer: MEDICARE

## 2025-04-17 ENCOUNTER — ANESTHESIA EVENT (OUTPATIENT)
Dept: ENDOSCOPY | Facility: HOSPITAL | Age: 65
End: 2025-04-17
Payer: MEDICARE

## 2025-04-17 ENCOUNTER — HOSPITAL ENCOUNTER (OUTPATIENT)
Dept: ENDOSCOPY | Facility: HOSPITAL | Age: 65
Discharge: HOME | End: 2025-04-17
Attending: STUDENT IN AN ORGANIZED HEALTH CARE EDUCATION/TRAINING PROGRAM
Payer: MEDICARE

## 2025-04-17 VITALS
HEART RATE: 59 BPM | TEMPERATURE: 97 F | OXYGEN SATURATION: 96 % | RESPIRATION RATE: 14 BRPM | SYSTOLIC BLOOD PRESSURE: 134 MMHG | DIASTOLIC BLOOD PRESSURE: 63 MMHG

## 2025-04-17 DIAGNOSIS — Z12.11 SPECIAL SCREENING FOR MALIGNANT NEOPLASMS, COLON: ICD-10-CM

## 2025-04-17 PROCEDURE — 37000008 HC ANESTHESIA 1ST 15 MINUTES

## 2025-04-17 PROCEDURE — 63600175 PHARM REV CODE 636 W HCPCS: Performed by: NURSE ANESTHETIST, CERTIFIED REGISTERED

## 2025-04-17 PROCEDURE — 37000009 HC ANESTHESIA EA ADD 15 MINS

## 2025-04-17 RX ORDER — PROPOFOL 10 MG/ML
VIAL (ML) INTRAVENOUS
Status: DISCONTINUED | OUTPATIENT
Start: 2025-04-17 | End: 2025-04-17

## 2025-04-17 RX ORDER — LIDOCAINE HYDROCHLORIDE 20 MG/ML
INJECTION, SOLUTION EPIDURAL; INFILTRATION; INTRACAUDAL; PERINEURAL
Status: DISCONTINUED | OUTPATIENT
Start: 2025-04-17 | End: 2025-04-17

## 2025-04-17 RX ORDER — PHENYLEPHRINE HYDROCHLORIDE 10 MG/ML
INJECTION INTRAVENOUS
Status: DISCONTINUED | OUTPATIENT
Start: 2025-04-17 | End: 2025-04-17

## 2025-04-17 RX ADMIN — PROPOFOL 50 MG: 10 INJECTION, EMULSION INTRAVENOUS at 09:04

## 2025-04-17 RX ADMIN — PHENYLEPHRINE HYDROCHLORIDE 100 MCG: 10 INJECTION INTRAVENOUS at 10:04

## 2025-04-17 RX ADMIN — LIDOCAINE HYDROCHLORIDE 50 MG: 20 INJECTION, SOLUTION EPIDURAL; INFILTRATION; INTRACAUDAL; PERINEURAL at 09:04

## 2025-04-17 RX ADMIN — PROPOFOL 20 MG: 10 INJECTION, EMULSION INTRAVENOUS at 09:04

## 2025-04-17 RX ADMIN — PROPOFOL 100 MG: 10 INJECTION, EMULSION INTRAVENOUS at 09:04

## 2025-04-17 NOTE — ANESTHESIA POSTPROCEDURE EVALUATION
Anesthesia Post Evaluation    Patient: Cb Swan    Procedure(s) Performed: * No procedures listed *    Final Anesthesia Type: general      Patient location during evaluation: PACU  Patient participation: Yes- Able to Participate  Level of consciousness: awake and alert, oriented and awake  Post-procedure vital signs: reviewed and stable  Pain management: adequate  Airway patency: patent    PONV status at discharge: No PONV  Anesthetic complications: no      Cardiovascular status: blood pressure returned to baseline, hemodynamically stable and stable  Respiratory status: unassisted, spontaneous ventilation and room air  Hydration status: euvolemic  Follow-up not needed.              Vitals Value Taken Time   BP 96/51 04/17/25 09:53   Temp 36.2 °C (97.2 °F) 04/17/25 09:43   Pulse 59 04/17/25 09:53   Resp 16 04/17/25 09:53   SpO2 96 % 04/17/25 09:53         No case tracking events are documented in the log.      Pain/Isabella Score: Isabella Score: 8 (4/17/2025  9:43 AM)

## 2025-04-17 NOTE — DISCHARGE SUMMARY
Brief Operative Note    Date of surgery: 04/17/2025    Pre-operative Diagnosis:  Screening for colon cancer     Post-operative Diagnosis:   Same as above    Surgeon: Jose L Ríos M.D.    Procedure:  Colonoscopy     Anesthesia: Monitored anesthesia care     Findings:  See operative note    Estimated blood loss: Minimal         Specimens:   See operative note    Discharge Note    Outcome:   Patient tolerated treatment/procedure well without complication and is now ready for discharge.    Disposition:   Home or Self Care    Final diagnosis:    Screening for colon cancer    Follow-up:   With primary care provider    Discharge Procedure Orders   Diet Adult Regular     No dressing needed     Notify your health care provider if you experience any of the following:  temperature >100.4     Notify your health care provider if you experience any of the following:  persistent nausea and vomiting or diarrhea     Notify your health care provider if you experience any of the following:  severe uncontrolled pain     Notify your health care provider if you experience any of the following:  redness, tenderness, or signs of infection (pain, swelling, redness, odor or green/yellow discharge around incision site)     Notify your health care provider if you experience any of the following:  difficulty breathing or increased cough     Notify your health care provider if you experience any of the following:  severe persistent headache     Notify your health care provider if you experience any of the following:  worsening rash     Notify your health care provider if you experience any of the following:  persistent dizziness, light-headedness, or visual disturbances     Notify your health care provider if you experience any of the following:  increased confusion or weakness     Activity as tolerated          Jose L Ríos MD

## 2025-04-17 NOTE — H&P
[facility]  Colon and Rectal Surgery    [address]  [place], [place]  Tel: [phone]  Fax: [phone]  [email or web address]   MD Berhane Elizalde MD Brian Kann, MD W. Forrest Johnston, MD Matthew Zelhart, MD Jennifer Paruch, MD William Kethman, MD Danielle Kay, MD Steven Schuetz, MD     Patient name: Cb Swan   YOB: 1960   MRN: [id number]  Date of procedure: 04/17/2025    Procedure: Colonoscopy  Indications: Screening for colon cancer and No family history of colorectal cancer    Last colonoscopy: >12 years ago (Unknown)    The patient was informed of the availability of a certified  without charge. A certified  was not necessary for this visit.    Sedation plan: MAC  ASA: ASA 2 - Patient with mild systemic disease with no functional limitations    Review of Systems  See above    Past Medical History:   Diagnosis Date    Diabetes mellitus, type 2     Hypertension      Past Surgical History:   Procedure Laterality Date    COLONOSCOPY      LEFT HEART CATHETERIZATION Left 03/14/2023    Procedure: Left heart cath;  Surgeon: Stevan Sandoval MD;  Location: [facility] CATH LAB;  Service: Cardiology;  Laterality: Left;    STENT, DRUG ELUTING, SINGLE VESSEL, CORONARY  03/14/2023    Procedure: Stent, Drug Eluting, Single Vessel, Coronary;  Surgeon: Stevan Sandoval MD;  Location: [facility] CATH LAB;  Service: Cardiology;;    TONSILLECTOMY      as child    VENTRICULOGRAM, LEFT Left 03/14/2023    Procedure: left ventriculography;  Surgeon: Stevan Sandoval MD;  Location: [facility] CATH LAB;  Service: Cardiology;  Laterality: Left;     Family History   Problem Relation Name Age of Onset    Diabetes Mother      Diabetes Sister       Social History[1]  Review of patient's allergies indicates:  No Known Allergies    Prior to Admission medications    Medication Sig Start Date End Date Taking? Authorizing Provider   atenoloL (TENORMIN)  50 MG tablet Take 50 mg by mouth 2 (two) times daily. 1/30/23  Yes Provider, Historical   atorvastatin (LIPITOR) 80 MG tablet TAKE 1 TABLET DAILY 1/24/25  Yes Stevan Sandoval MD   hydroCHLOROthiazide (HYDRODIURIL) 12.5 MG Tab Take 1 tablet (12.5 mg total) by mouth once daily. 10/23/24 10/23/25 Yes Stevan Sandoval MD   JARDIANCE 10 mg tablet Take 1 tablet (10 mg total) by mouth once daily. 2/14/25  Yes Isaiah Gongora MD   amLODIPine-benazepriL (LOTREL) 10-40 mg per capsule Take 1 capsule by mouth once daily. 2/16/23 4/2/25  Gemma Perez, NP   aspirin (ECOTRIN) 81 MG EC tablet Take 1 tablet (81 mg total) by mouth once daily. 2/16/23 4/2/25  Stevan Sandoval MD   bisacodyL (DULCOLAX) 5 mg EC tablet Take 4 tablets (20 mg total) by mouth once. for 1 dose 4/16/25 4/16/25  Jose L Ríos MD   FREESTYLE PRINCESS 3 PLUS SENSOR Anna 1 Device by Misc.(Non-Drug; Combo Route) route every 14 (fourteen) days. 10/4/24 10/4/25  Isaiah Gongora MD   metFORMIN (GLUCOPHAGE-XR) 500 MG ER 24hr tablet Take 2 tablets (1,000 mg total) by mouth 2 (two) times daily with meals. 2/14/25   Isaiah Gongora MD MOUNJARO 7.5 mg/0.5 mL PnIj Inject 7.5 mg into the skin every 7 days. 10/15/24   Isaiah Gongora MD   sodium,potassium,mag sulfates (SUPREP BOWEL PREP KIT) 17.5-3.13-1.6 gram SolR Take 354 mLs by mouth once daily. for 1 day 4/16/25 4/17/25  Jose L Ríos MD       Physical Examination  BP (!) 142/65 (BP Location: Left arm, Patient Position: Lying)   Pulse 63   Resp 16   SpO2 98%      Constitutional: well developed, no cough, no dyspnea, alert, and no acute distress    Head: Normocephalic, no lesions, without obvious abnormality  Eye: Normal external eye, conjunctiva, and lids, PERRL  Cardiovascular: regular rate and regular rhythm  Respiratory: normal air entry  Gastrointestinal: soft, non-tender, without masses or organomegaly  Neurologic: alert, oriented, normal speech, no focal findings or movement  disorder noted  Psychiatric: appropriate, normal mood    Plan of Care    It was a pleasure meeting Mr. Swan today - we will plan to perform a colonoscopy with monitored anesthesia care. The details of the procedure, the possible need for biopsy or polypectomy and the potential risks including bleeding, perforation, missed polyps were discussed in detail and they consented to undergo the procedure.      Jose L Ríos MD, FACS, FASCRS  Department of Colon & Rectal Surgery  [facility]         [1]   Social History  Tobacco Use    Smoking status: Former     Passive exposure: Never   Substance Use Topics    Alcohol use: No    Drug use: Never

## 2025-04-17 NOTE — TRANSFER OF CARE
Anesthesia Transfer of Care Note    Patient: Cb Swan    Procedure(s) Performed: * No procedures listed *    Patient location: PACU    Anesthesia Type: general    Transport from OR: Transported from OR on 6-10 L/min O2 by face mask with adequate spontaneous ventilation    Post pain: adequate analgesia    Post assessment: no apparent anesthetic complications and tolerated procedure well    Post vital signs: stable    Level of consciousness: sedated    Nausea/Vomiting: no nausea/vomiting    Complications: none    Transfer of care protocol was followed      Last vitals: Visit Vitals  BP (!) 142/65 (BP Location: Left arm, Patient Position: Lying)   Pulse 63   Resp 16   SpO2 98%

## 2025-04-17 NOTE — ANESTHESIA PREPROCEDURE EVALUATION
04/17/2025  Cb Swan is a 65 y.o., male.      Pre-op Assessment    I have reviewed the Patient Summary Reports.     I have reviewed the Nursing Notes. I have reviewed the NPO Status.   I have reviewed the Medications.     Review of Systems  Anesthesia Hx:  No problems with previous Anesthesia                Social:  No Alcohol Use, Non-Smoker       Hematology/Oncology:  Hematology Normal   Oncology Normal                                   EENT/Dental:  EENT/Dental Normal           Cardiovascular:     Hypertension, well controlled   CAD      Angina                                    Pulmonary:  Pulmonary Normal                       Renal/:  Renal/ Normal                 Hepatic/GI:  Hepatic/GI Normal                    Musculoskeletal:  Musculoskeletal Normal                Neurological:  Neurology Normal                                      Endocrine:  Diabetes           Dermatological:  Skin Normal    Psych:  Psychiatric Normal                  Physical Exam  General: Well nourished    Airway:  Mallampati: II   Mouth Opening: Normal  TM Distance: Normal  Tongue: Normal  Neck ROM: Normal ROM    Chest/Lungs:  Clear to auscultation    Heart:  Rate: Normal  Rhythm: Regular Rhythm  Sounds: Normal      Anesthesia Plan  Type of Anesthesia, risks & benefits discussed:    Anesthesia Type: Gen Natural Airway  Intra-op Monitoring Plan: Standard ASA Monitors  Post Op Pain Control Plan: multimodal analgesia  Induction:  IV  Airway Plan: Direct  Informed Consent: Informed consent signed with the Patient and all parties understand the risks and agree with anesthesia plan.  All questions answered.   ASA Score: 3  Day of Surgery Review of History & Physical: H&P Update referred to the surgeon/provider.I have interviewed and examined the patient. I have reviewed the patient's H&P dated: 4/17/25. There are no  significant changes.     Ready For Surgery From Anesthesia Perspective.     .

## 2025-04-23 ENCOUNTER — RESULTS FOLLOW-UP (OUTPATIENT)
Dept: SURGERY | Facility: CLINIC | Age: 65
End: 2025-04-23

## 2025-05-09 ENCOUNTER — TELEPHONE (OUTPATIENT)
Dept: ENDOSCOPY | Facility: HOSPITAL | Age: 65
End: 2025-05-09
Payer: MEDICARE

## 2025-05-09 NOTE — TELEPHONE ENCOUNTER
"----- Message from Heather sent at 4/24/2025  8:01 AM CDT -----    ----- Message -----  From: Jose L Ríos MD  Sent: 4/23/2025   2:10 PM CDT  To: Anibal Hauser MD; [facility] Endoscopist #    Procedure: Colonoscopy - balloon assisted with Dr. Hauser, will need additional time per his specificationDiagnosis: Screening colonoscopy - difficult colonoscopy, unable to completeProcedure Timing: Specified interval of within 4-8 weeks#If within 4 weeks selected, please sonia as high priority##If greater than 12 weeks, please select "5-12 weeks" and delay sending until 3 months prior to requested date# Location: with Dr. HauserAdditional Scheduling Information: No scheduling concernsPrep Specifications:Standard prepIs the patient taking a GLP-1 Agonist:noHave you attached a patient to this message: yes  "

## 2025-05-09 NOTE — TELEPHONE ENCOUNTER
"Jose L Ríos MD  [facility]  Cc: Anibal Hauser MD  Caller: Unspecified (2 weeks ago)  Procedure: Colonoscopy - balloon assisted with Dr. Hauser, will need additional time per his specification    Diagnosis: Screening colonoscopy - difficult colonoscopy, unable to complete    Procedure Timing: Specified interval of within 4-8 weeks    *If within 4 weeks selected, please sonia as high priority*    *If greater than 12 weeks, please select "5-12 weeks" and delay sending until 3 months prior to requested date*    Location: with Dr. Hauser    Additional Scheduling Information: No scheduling concerns    Prep Specifications:Standard prep    Is the patient taking a GLP-1 Agonist:no    Have you attached a patient to this message: yes  "

## 2025-05-27 ENCOUNTER — TELEPHONE (OUTPATIENT)
Dept: ENDOSCOPY | Facility: HOSPITAL | Age: 65
End: 2025-05-27
Payer: MEDICARE

## 2025-06-02 ENCOUNTER — TELEPHONE (OUTPATIENT)
Dept: ENDOSCOPY | Facility: HOSPITAL | Age: 65
End: 2025-06-02
Payer: MEDICARE

## 2025-06-05 ENCOUNTER — TELEPHONE (OUTPATIENT)
Dept: ENDOSCOPY | Facility: HOSPITAL | Age: 65
End: 2025-06-05
Payer: MEDICARE

## 2025-06-16 ENCOUNTER — TELEPHONE (OUTPATIENT)
Dept: ENDOSCOPY | Facility: HOSPITAL | Age: 65
End: 2025-06-16
Payer: MEDICARE

## 2025-06-16 DIAGNOSIS — Z12.11 SCREEN FOR COLON CANCER: Primary | ICD-10-CM

## 2025-06-16 RX ORDER — SODIUM, POTASSIUM,MAG SULFATES 17.5-3.13G
1 SOLUTION, RECONSTITUTED, ORAL ORAL DAILY
Qty: 354 ML | Refills: 0 | Status: SHIPPED | OUTPATIENT
Start: 2025-06-16 | End: 2025-06-18

## 2025-06-16 NOTE — TELEPHONE ENCOUNTER
Patient is scheduled for a Colonoscopy on 8/4/25 with Dr. PAUL Hauser.  Instructions as below emailed to [email or web address]         Colonoscopy Procedure Prep Instructions      Date of procedure: 8/4/25 Arrive at: 9:30AM    Location of Department:   [facility] [address] [place] [address]  Take the [facility] Elevators to 2nd Floor Outpatient Surgery    As soon as possible:   your prep from pharmacy and over the counter DULCOLAX LAXATIVE TABLETS   If you are taking any injectable medication (s) for weight loss and/or diabetes  weekly, hold for 8 days prior to your scheduled procedure, please stop taking Mounjaro (Tirzepatide)}on 7/27/25. After the procedure, your provider will inform you  of when to resume injection.  If you are taking the oral medication(s):Jardiance (Empagliflozin), hold 3 days prior to your procedure, please stop taking on 8/1/25 (last dose 7/31/25).     On the day before your procedure   What You CAN do:   You may have clear liquids ONLY -see below for list.     Liquids That Are OK to Drink:   Water  Sports drinks (Gatorade, Power-Aid)  Coffee or tea (no cream or nondairy creamer)  Clear juices without pulp (apple, white grape)  Gelatin desserts (no fruit or toppings)  Clear soda (sprite, coke, ginger ale)  Chicken broth (until 12 midnight the night before procedure)      What You CANNOT do:   Do not EAT solid food, drink milk or anything   colored red.  Do not drink alcohol.  Do not take oral medications within 1 hour of starting   each dose of SUPREP.  No gum chewing or candy morning of procedure.      Note:   (Please disregard the insert instructions from pharmacy).  SUPREP Bowel Prep Kit is indicated for cleansing of the colon as a preparation for colonoscopy in adults.   Be sure to tell your doctor about all the medicines you take, including prescription and non-prescription medicines, vitamins, and herbal supplements. SUPREP Bowel Prep Kit may affect how other  medicines work.  Medication taken by mouth may not be absorbed properly when taken within 1 hour before the start of each dose of SUPREP Bowel Prep Kit.    It is not uncommon to experience some abdominal cramping, nausea and/or vomiting when taking the prep. If you have nausea and/or vomiting while taking the prep, stop drinking for 20 to 30 minutes then continue.    How to take prep:    SUPREP Bowel Prep Kit is a (2-day) prep.   Both 6-ounce bottles are required for a complete preparation for colonoscopy. Dilute the solution concentrate as directed prior to use. You must drink water with each dose of SUPREP, and additional water after each dose.    DOSE 1--Day Before Colonoscopy 8/3/25    Drink at least 6 to 8 glasses of clear liquids from time you wake up until you begin your prep and then continue until bedtime to avoid dehydration.     12:00 pm (NOON) Take four (4) Dulcolax (Bisacodyl) tablets with at least 8 ounces or more of clear liquids.      6:00 pm:    You must complete Steps 1 through 4 using one (1) 6-ounce bottle before going to bed as shown below:    Step 1-Pour ONE (1) 6-ounce bottle of SUPREP liquid into the mixing container.  Step 2-Add cool drinking water to the 16-ounce line on the container and mix.  Step 3-Drink ALL the liquid in the container.  Step 4-You must drink two (2) more 16-ounce containers of water over the next 1 hour.  IMPORTANT: If you experience preparation-related symptoms (for example, nausea, bloating, or cramping), stop, or slow the rate of drinking the additional water until your symptoms decrease.    DOSE 2--Day of the Colonoscopy 8/4/25 at 2-3 AM.    For this dose, repeat Steps 1 through 4 shown above using the other 6-ounce bottle.   You may continue drinking water/clear liquids until   4 hours before your colonoscopy or as directed by the scheduling nurse  6:30AM.    For information about your procedure, two (2) things to view prior to colonoscopy:  Please watch this  informational video. It is important to watch this animated consent video prior to your arrival. If you haven't watched the video prior to arriving, you are required to watch it during admission which can causes delays.    Options for viewing:   Using a keyboard:  press and hold the control tab (Ctrl) and left mouse click to follow links.           Colonoscopy Instructional Video                                                                                   OR    Type link address into your web browser's address bar:  [email or web address]      Educational Booklet with pictures:      Colonoscopy Prep - Liquid      Comments:      IMPORTANT INFORMATION TO KNOW BEFORE YOUR PROCEDURE    [facility] [place] 2nd Floor         If your procedure requires the administration of anesthesia, it is necessary for a responsible adult to drive you home. (Medical Transportation, Uber, Lyft, Taxi, etc. may ONLY be used if a responsible adult is present to accompany you home.  The responsible adult CAN'T be the  of the service).      person must be available to return to pick you up within 15 minutes of being notified of discharge.       Please bring a picture ID, insurance card, & copayment      Take Medications as directed below:    If you are taking any injectable medication (s) for weight loss and/or diabetes  weekly, hold for 8 days prior to your scheduled procedure, please stop taking Mounjaro (Tirzepatide)}on 7/27/25. After the procedure, your provider will inform you  of when to resume injection.    If you are taking the oral medication(s):  Jardiance (Empagliflozin), hold 3 days prior to your procedure, please stop taking on 8/1/25 (last dose 7/31/25).     If you begin taking any blood thinning medications, injectable weight loss/diabetes medications (other than insulin) , Adipex (Phentermine) , please contact the endoscopy scheduling department listed below as soon as  possible.    If you are diabetic see the attached instruction sheet regarding your medication.     If you take HEART, BLOOD PRESSURE, SEIZURE, PAIN, LUNG (including inhalers/nebulizers), ANTI-REJECTION (transplant patients), or PSYCHIATRIC medications, please take at your regular times with a sip of water or as directed by the scheduling nurse.     Important contact information:    Endoscopy [phone] Hours of operation Monday-Friday 8:00-4:30pm.    Questions about insurance or financial obligations call [phone] or [phone].    If you have questions regarding the prep or need to reschedule, please call [phone]. After hours questions requiring immediate assistance, contact [facility] nurse line at [phone] or [phone].   NOTE:     On occasion, unforeseen circumstances may cause a delay in your procedure start time. We respect your time and appreciate your patience during these circumstances.      Comments:     If you are unsure about any of these instructions, call [facility] at [phone].                                 Oral Medicine Week of Procedure Day of Prep   Day of Procedure            Glyburide       Do NOT take morning of procedure. If you        Glucotrol (Glipizide)   Do NOT take.   take twice daily, take with dinner.        Amaryl (Glimepiride)                                     Glucophage       Do NOT take morning of procedure. Take        Glumetza   Take as   when you start eating again.          Fortamet (Metformin)   prescribed.                                 Januvia (Sitaglipitin)       Do NOT take morning of procedure. Take        Nesina (Aloglipitin)       when you start eating again.          Onglyza (Saxaglipitin)   Take as                  Tradjenta (Linaglipitin)   prescribed.                                 Invokana (Canagliflozin)                      Farxiga (Dapagliflozin)       Do NOT take morning of procedure. Take         Jardiance(Empagliflozin) STOP 2 days before you start prep Do NOT take   when you start eating again.                         Actos (Pioglitazone)   Take as   Do NOT take morning of procedure. Take            prescribed.   when you start eating again.                          Injectable & Combination   Daily Dose   Weekly Dose            Medicine                      Adlyxin (Lixisenatide)   If you take these   For weekly dose, hold dose at least 8 days prior      Byetta (Exenatide)   medications daily   to appointment. You may take the dose after your      Bydureon (Exenatide XL)   on the day of your   procedure.            Ozempic (Semaglutide)   appointment hold                   Trulicity (Dulaglutide)   that dose until                   Victoza (Liraglutide)   after your                  Mounjaro (Tirzepatide)   procedure.                  Lefty Resendiz                      It is important to monitor your blood sugar while doing the bowel preparation. On the day of your bowel prep, when you are on a clear liquid diet, you may drink beverages with sugar as your source of glucose. Be sure to mix the prep with water or sugar free liquid only. Below are instructions on how to adjust your diabetic medications prior to your scheduled procedure. Call the healthcare provider who manages your diabetes if you have questions.      Insulin for Type 1 Diabetes Mellitus   Day of Prep                                          Day of procedure            Basaglar If you use in the morning, take as prescribed.        If you take in the morning,          Lantus If you use in the evening, inject 70% of dose.       inject 80% of dose. If you take         Levemir           in the evenings, inject usual          Toujeo           dose.              Patricia Barrett Count carbs and adjust dose        Do NOT take morining of procedure.           Apidra accordingly. If not carb counting,        Take when you start eating again.          Humalog 100 take 25% of usual meal dose.                       Humalog 200 May use correction dose every                        Novolog 4 hours. Do NOT use once sugar-free                         liquid prep is started.                                         Insulin Pump SEE SEPARATE PUMP GUIDANCE                                                                                                                       Insulin for Type 2 Diabetes Mellitus   Day of Prep                                          Day of procedure            Basaglar If you use in the morning, take as prescribed.        If you take in the morning,          Lantus If you use in the evening, inject 70% of dose.       inject 80% of dose. If you take         Levemir           in the evenings, inject usual          Toujeo           dose.              Tresiba                                                                     Afrezza Inject 50 % of dose with clear liquid diet.        Do NOT take morning of          Apidra Do NOT use once sugar-free clear liquid prep       procedure. Take when you          Fiasp is started. If you are using a correction scale,        start eating meals again.          Humalog 100 take dose every 4 hours as needed.                        Humalog 200                         Novolog                                           NPH  Inject 50% of breakfast and dinner          Do NOT take morning of           doses with clear liquid diet.          procedure. Take usual dose                     when you eat dinner.                            Regular  Inject 50% of breakfast dose and do NOT take       Do NOT take morning of            dinner dose once sugar-free liquid prep has        procedure. Take usual dose            started. If using correction scale, may take dose       when you eat dinner.            every 6 hours as  needed.                                          Novolog Mix 70/30 Inject 50% of breakfast dose. Inject 25%       Do NOT take breakfast dose.          Humolog Mix 75/25 of dinner dose.          Take usual dose when you eat          Humolog Mix 50/50           dinner.                                U500 Take 50% of AM or breakfast unit sonia dose.       Do NOT take mornining of            Take 25% of lunch or dinner or PM unit sonia dose.        procedure. Take when you                      start eating again.                              V-Go  Continue to wear your V-Go device. Do NOT click        Coninue to wear your V-Go           once sugar-free clear liquid prep is started.        device. Resume clicks with                      meals.                                                  Revised 3.4.24

## 2025-06-16 NOTE — TELEPHONE ENCOUNTER
----- Message -----   From: Anibal Hauser MD   Sent: 6/2/2025   5:17 PM CDT   To: SILVA Ivan, still catching up on messages.  You can schedule with me in a 60-min general GI slot, I will use the slim pediatric colonoscope, and will need the balloon enteroscope available in case needed.     Romie Hauser

## 2025-08-04 ENCOUNTER — HOSPITAL ENCOUNTER (OUTPATIENT)
Facility: HOSPITAL | Age: 65
Discharge: HOME | End: 2025-08-04
Attending: INTERNAL MEDICINE | Admitting: INTERNAL MEDICINE
Payer: MEDICARE

## 2025-08-04 ENCOUNTER — ANESTHESIA (OUTPATIENT)
Dept: ENDOSCOPY | Facility: HOSPITAL | Age: 65
End: 2025-08-04
Payer: MEDICARE

## 2025-08-04 ENCOUNTER — ANESTHESIA EVENT (OUTPATIENT)
Dept: ENDOSCOPY | Facility: HOSPITAL | Age: 65
End: 2025-08-04
Payer: MEDICARE

## 2025-08-04 VITALS
TEMPERATURE: 98 F | DIASTOLIC BLOOD PRESSURE: 69 MMHG | WEIGHT: 167 LBS | RESPIRATION RATE: 20 BRPM | OXYGEN SATURATION: 99 % | HEIGHT: 63 IN | BODY MASS INDEX: 29.59 KG/M2 | HEART RATE: 65 BPM | SYSTOLIC BLOOD PRESSURE: 124 MMHG

## 2025-08-04 DIAGNOSIS — Z12.11 COLON CANCER SCREENING: Primary | ICD-10-CM

## 2025-08-04 LAB
POCT GLUCOSE: 143 MG/DL (ref 70–110)
POCT GLUCOSE: 144 MG/DL (ref 70–110)

## 2025-08-04 PROCEDURE — 82962 GLUCOSE BLOOD TEST: CPT | Performed by: INTERNAL MEDICINE

## 2025-08-04 PROCEDURE — 63600175 PHARM REV CODE 636 W HCPCS: Performed by: NURSE ANESTHETIST, CERTIFIED REGISTERED

## 2025-08-04 PROCEDURE — 25000003 PHARM REV CODE 250: Performed by: INTERNAL MEDICINE

## 2025-08-04 PROCEDURE — 37000008 HC ANESTHESIA 1ST 15 MINUTES: Performed by: INTERNAL MEDICINE

## 2025-08-04 PROCEDURE — G0121 COLON CA SCRN NOT HI RSK IND: HCPCS | Performed by: INTERNAL MEDICINE

## 2025-08-04 PROCEDURE — G0121 COLON CA SCRN NOT HI RSK IND: HCPCS | Mod: ,,, | Performed by: INTERNAL MEDICINE

## 2025-08-04 PROCEDURE — 37000009 HC ANESTHESIA EA ADD 15 MINS: Performed by: INTERNAL MEDICINE

## 2025-08-04 RX ORDER — ONDANSETRON HYDROCHLORIDE 2 MG/ML
4 INJECTION, SOLUTION INTRAVENOUS DAILY PRN
Status: DISCONTINUED | OUTPATIENT
Start: 2025-08-04 | End: 2025-08-04 | Stop reason: HOSPADM

## 2025-08-04 RX ORDER — GLUCAGON 1 MG
1 KIT INJECTION
Status: DISCONTINUED | OUTPATIENT
Start: 2025-08-04 | End: 2025-08-04 | Stop reason: HOSPADM

## 2025-08-04 RX ORDER — LIDOCAINE HYDROCHLORIDE 20 MG/ML
INJECTION INTRAVENOUS
Status: DISCONTINUED | OUTPATIENT
Start: 2025-08-04 | End: 2025-08-07

## 2025-08-04 RX ORDER — SODIUM CHLORIDE 9 MG/ML
INJECTION, SOLUTION INTRAVENOUS CONTINUOUS
Status: DISCONTINUED | OUTPATIENT
Start: 2025-08-04 | End: 2025-08-04 | Stop reason: HOSPADM

## 2025-08-04 RX ORDER — PROPOFOL 10 MG/ML
VIAL (ML) INTRAVENOUS
Status: DISCONTINUED | OUTPATIENT
Start: 2025-08-04 | End: 2025-08-07

## 2025-08-04 RX ORDER — SODIUM CHLORIDE 0.9 % (FLUSH) 0.9 %
10 SYRINGE (ML) INJECTION
Status: DISCONTINUED | OUTPATIENT
Start: 2025-08-04 | End: 2025-08-04 | Stop reason: HOSPADM

## 2025-08-04 RX ADMIN — SODIUM CHLORIDE: 0.9 INJECTION, SOLUTION INTRAVENOUS at 09:08

## 2025-08-04 RX ADMIN — PROPOFOL 60 MG: 10 INJECTION, EMULSION INTRAVENOUS at 09:08

## 2025-08-04 RX ADMIN — PROPOFOL 10 MG: 10 INJECTION, EMULSION INTRAVENOUS at 09:08

## 2025-08-04 RX ADMIN — LIDOCAINE HYDROCHLORIDE 100 MG: 20 INJECTION INTRAVENOUS at 09:08

## 2025-08-04 RX ADMIN — PROPOFOL 150 MCG/KG/MIN: 10 INJECTION, EMULSION INTRAVENOUS at 09:08

## 2025-08-04 NOTE — TRANSFER OF CARE
"Anesthesia Transfer of Care Note    Patient: Cb Swan    Procedure(s) Performed: Procedure(s) (LRB):  COLONOSCOPY (N/A)    Patient location: [facility]    Anesthesia Type: general    Transport from OR: Transported from OR on room air with adequate spontaneous ventilation    Post pain: adequate analgesia    Post assessment: no apparent anesthetic complications and tolerated procedure well    Post vital signs: stable    Level of consciousness: sedated    Nausea/Vomiting: no nausea/vomiting    Complications: none    Transfer of care protocol was followed    Last vitals: Visit Vitals  BP (!) 102/55   Pulse 74   Temp 37 °C (98.6 °F) (Temporal)   Resp 16   Ht 5' 3" (1.6 m)   Wt 75.8 kg (167 lb)   SpO2 97%   BMI 29.58 kg/m²     "

## 2025-08-04 NOTE — ANESTHESIA PREPROCEDURE EVALUATION
08/04/2025  Cb Swan is a 65 y.o., male Pre-operative evaluation for Procedure(s) (LRB):  COLONOSCOPY (N/A)    Cb Swan is a 65 y.o. male     Problem List[1]    Review of patient's allergies indicates:  No Known Allergies    Medications Ordered Prior to Encounter[2]    Past Surgical History:   Procedure Laterality Date    COLONOSCOPY      LEFT HEART CATHETERIZATION Left 03/14/2023    Procedure: Left heart cath;  Surgeon: Stevan Sandvoal MD;  Location: [facility] CATH LAB;  Service: Cardiology;  Laterality: Left;    STENT, DRUG ELUTING, SINGLE VESSEL, CORONARY  03/14/2023    Procedure: Stent, Drug Eluting, Single Vessel, Coronary;  Surgeon: Stevan Sandoval MD;  Location: [facility] CATH LAB;  Service: Cardiology;;    TONSILLECTOMY      as child    VENTRICULOGRAM, LEFT Left 03/14/2023    Procedure: left ventriculography;  Surgeon: Stevan Sandoval MD;  Location: [facility] CATH LAB;  Service: Cardiology;  Laterality: Left;       Pre-op Assessment    I have reviewed the Patient Summary Reports.     I have reviewed the Nursing Notes. I have reviewed the NPO Status.   I have reviewed the Medications.     Review of Systems  Anesthesia Hx:  No problems with previous Anesthesia   History of prior surgery of interest to airway management or planning:          Denies Family Hx of Anesthesia complications.    Denies Personal Hx of Anesthesia complications.                    Social:  No Alcohol Use, Non-Smoker       Hematology/Oncology:  Hematology Normal   Oncology Normal                                   EENT/Dental:  EENT/Dental Normal           Cardiovascular:  Exercise tolerance: good   Hypertension   CAD      Angina                                    Pulmonary:  Pulmonary Normal                       Renal/:  Renal/ Normal                 Hepatic/GI:  Hepatic/GI Normal                    Neurological:  Neurology  Normal                                      Endocrine:  Endocrine Normal            Psych:  Psychiatric Normal                    Physical Exam  General: Well nourished, Cooperative, Alert and Oriented    Airway:  Mallampati: II   Mouth Opening: Normal  TM Distance: Normal  Tongue: Normal  Neck ROM: Normal ROM    Dental:  Intact    Chest/Lungs:  Normal Respiratory Rate, Clear to auscultation    Heart:  Rate: Normal  Rhythm: Regular Rhythm        Anesthesia Plan  Type of Anesthesia, risks & benefits discussed:    Anesthesia Type: Gen Natural Airway  Intra-op Monitoring Plan: Standard ASA Monitors  Post Op Pain Control Plan: multimodal analgesia and IV/PO Opioids PRN  Induction:  IV  Airway Plan: Direct  Informed Consent: Informed consent signed with the Patient and all parties understand the risks and agree with anesthesia plan.  All questions answered. Patient consented to blood products? No  ASA Score: 3  Day of Surgery Review of History & Physical: H&P Update referred to the surgeon/provider.    Ready For Surgery From Anesthesia Perspective.     .           [1]   Patient Active Problem List  Diagnosis    Coronary artery disease of native artery of native heart with stable angina pectoris    Hypertension    Elevated coronary artery calcium score    Type 2 diabetes mellitus with hyperglycemia, without long-term current use of insulin    Carotid stenosis, asymptomatic, bilateral    Mixed hyperlipidemia    Class 1 obesity due to excess calories without serious comorbidity in adult    BMI 29.0-29.9,adult    Type 2 diabetes mellitus with other specified complication, without long-term current use of insulin    Other nonthrombocytopenic purpura   [2]   No current facility-administered medications on file prior to encounter.     Current Outpatient Medications on File Prior to Encounter   Medication Sig Dispense Refill    amLODIPine-benazepriL (LOTREL) 10-40 mg per capsule Take 1 capsule by mouth once daily. 90 capsule 3     aspirin (ECOTRIN) 81 MG EC tablet Take 1 tablet (81 mg total) by mouth once daily.  0    atenoloL (TENORMIN) 50 MG tablet Take 50 mg by mouth 2 (two) times daily.      atorvastatin (LIPITOR) 80 MG tablet TAKE 1 TABLET DAILY 90 tablet 3    FREESTYLE PRINCESS 3 PLUS SENSOR Anna 1 Device by Misc.(Non-Drug; Combo Route) route every 14 (fourteen) days. 2 each 11    hydroCHLOROthiazide (HYDRODIURIL) 12.5 MG Tab Take 1 tablet (12.5 mg total) by mouth once daily. 90 tablet 3    JARDIANCE 10 mg tablet Take 1 tablet (10 mg total) by mouth once daily. 90 tablet 3    metFORMIN (GLUCOPHAGE-XR) 500 MG ER 24hr tablet Take 2 tablets (1,000 mg total) by mouth 2 (two) times daily with meals. 360 tablet 3    MOUNJARO 7.5 mg/0.5 mL PnIj Inject 7.5 mg into the skin every 7 days. 4 Pen 11

## 2025-08-07 NOTE — ANESTHESIA POSTPROCEDURE EVALUATION
Anesthesia Post Evaluation    Patient: Cb Swan    Procedure(s) Performed: Procedure(s) (LRB):  COLONOSCOPY (N/A)    Final Anesthesia Type: general      Patient location during evaluation: [facility]  Patient participation: Yes- Able to Participate  Level of consciousness: awake and alert and oriented  Post-procedure vital signs: reviewed and stable  Pain management: adequate  Airway patency: patent    PONV status at discharge: No PONV  Anesthetic complications: no      Cardiovascular status: blood pressure returned to baseline and hemodynamically stable  Respiratory status: unassisted, spontaneous ventilation and room air  Hydration status: euvolemic  Follow-up not needed.              Vitals Value Taken Time   /69 08/04/25 11:15   Temp 36.6 °C (97.9 °F) 08/04/25 10:21   Pulse 65 08/04/25 11:15   Resp 20 08/04/25 11:15   SpO2 99 % 08/04/25 11:15         No case tracking events are documented in the log.      Pain/Isabella Score: No data recorded